# Patient Record
Sex: FEMALE | Race: WHITE | HISPANIC OR LATINO | Employment: OTHER | URBAN - METROPOLITAN AREA
[De-identification: names, ages, dates, MRNs, and addresses within clinical notes are randomized per-mention and may not be internally consistent; named-entity substitution may affect disease eponyms.]

---

## 2017-01-11 ENCOUNTER — LAB VISIT (OUTPATIENT)
Dept: LAB | Facility: HOSPITAL | Age: 57
End: 2017-01-11
Attending: INTERNAL MEDICINE

## 2017-01-11 DIAGNOSIS — Z79.899 ENCOUNTER FOR LONG-TERM (CURRENT) USE OF OTHER MEDICATIONS: Primary | ICD-10-CM

## 2017-01-11 PROCEDURE — 80197 ASSAY OF TACROLIMUS: CPT

## 2017-01-12 LAB — TACROLIMUS BLD-MCNC: 5.5 NG/ML

## 2017-03-31 ENCOUNTER — TELEPHONE (OUTPATIENT)
Dept: TRANSPLANT | Facility: CLINIC | Age: 57
End: 2017-03-31

## 2017-07-20 ENCOUNTER — LAB VISIT (OUTPATIENT)
Dept: LAB | Facility: HOSPITAL | Age: 57
End: 2017-07-20
Attending: INTERNAL MEDICINE

## 2017-07-20 DIAGNOSIS — T86.10 COMPLICATIONS OF TRANSPLANTED KIDNEY: ICD-10-CM

## 2017-07-20 DIAGNOSIS — Z79.899 ENCOUNTER FOR LONG-TERM (CURRENT) USE OF OTHER MEDICATIONS: ICD-10-CM

## 2017-07-20 DIAGNOSIS — Z94.0 KIDNEY REPLACED BY TRANSPLANT: Primary | ICD-10-CM

## 2017-07-20 LAB — TACROLIMUS BLD-MCNC: 5.5 NG/ML

## 2017-07-20 PROCEDURE — 36415 COLL VENOUS BLD VENIPUNCTURE: CPT

## 2017-07-20 PROCEDURE — 80197 ASSAY OF TACROLIMUS: CPT

## 2018-07-12 ENCOUNTER — TELEPHONE (OUTPATIENT)
Dept: TRANSPLANT | Facility: CLINIC | Age: 58
End: 2018-07-12

## 2018-07-12 DIAGNOSIS — Z94.0 STATUS POST KIDNEY TRANSPLANT: ICD-10-CM

## 2018-07-12 DIAGNOSIS — Z48.298 AFTERCARE FOLLOWING ORGAN TRANSPLANT: ICD-10-CM

## 2018-07-24 ENCOUNTER — LAB VISIT (OUTPATIENT)
Dept: LAB | Facility: HOSPITAL | Age: 58
End: 2018-07-24
Attending: INTERNAL MEDICINE

## 2018-07-24 DIAGNOSIS — Z48.298 AFTERCARE FOLLOWING ORGAN TRANSPLANT: ICD-10-CM

## 2018-07-24 DIAGNOSIS — Z94.0 STATUS POST KIDNEY TRANSPLANT: ICD-10-CM

## 2018-07-24 LAB
ALBUMIN SERPL BCP-MCNC: 3.7 G/DL
ALBUMIN SERPL BCP-MCNC: 3.7 G/DL
ALP SERPL-CCNC: 122 U/L
ALT SERPL W/O P-5'-P-CCNC: 24 U/L
ANION GAP SERPL CALC-SCNC: 8 MMOL/L
AST SERPL-CCNC: 17 U/L
BASOPHILS # BLD AUTO: 0.03 K/UL
BASOPHILS NFR BLD: 0.5 %
BILIRUB DIRECT SERPL-MCNC: 0.2 MG/DL
BILIRUB SERPL-MCNC: 0.6 MG/DL
BUN SERPL-MCNC: 27 MG/DL
CALCIUM SERPL-MCNC: 9 MG/DL
CHLORIDE SERPL-SCNC: 108 MMOL/L
CO2 SERPL-SCNC: 25 MMOL/L
CREAT SERPL-MCNC: 1.2 MG/DL
DIFFERENTIAL METHOD: ABNORMAL
EOSINOPHIL # BLD AUTO: 0.1 K/UL
EOSINOPHIL NFR BLD: 0.9 %
ERYTHROCYTE [DISTWIDTH] IN BLOOD BY AUTOMATED COUNT: 13.3 %
EST. GFR  (AFRICAN AMERICAN): 58.4 ML/MIN/1.73 M^2
EST. GFR  (NON AFRICAN AMERICAN): 50.6 ML/MIN/1.73 M^2
GLUCOSE SERPL-MCNC: 75 MG/DL
HCT VFR BLD AUTO: 40.7 %
HGB BLD-MCNC: 12.8 G/DL
IMM GRANULOCYTES # BLD AUTO: 0.03 K/UL
IMM GRANULOCYTES NFR BLD AUTO: 0.5 %
LYMPHOCYTES # BLD AUTO: 2.2 K/UL
LYMPHOCYTES NFR BLD: 37.5 %
MCH RBC QN AUTO: 30.2 PG
MCHC RBC AUTO-ENTMCNC: 31.4 G/DL
MCV RBC AUTO: 96 FL
MONOCYTES # BLD AUTO: 0.6 K/UL
MONOCYTES NFR BLD: 10.3 %
NEUTROPHILS # BLD AUTO: 2.9 K/UL
NEUTROPHILS NFR BLD: 50.3 %
NRBC BLD-RTO: 0 /100 WBC
PHOSPHATE SERPL-MCNC: 3.3 MG/DL
PLATELET # BLD AUTO: 272 K/UL
PMV BLD AUTO: 10.6 FL
POTASSIUM SERPL-SCNC: 4.1 MMOL/L
PROT SERPL-MCNC: 7.3 G/DL
RBC # BLD AUTO: 4.24 M/UL
SODIUM SERPL-SCNC: 141 MMOL/L
WBC # BLD AUTO: 5.74 K/UL

## 2018-07-24 PROCEDURE — 36415 COLL VENOUS BLD VENIPUNCTURE: CPT | Mod: PO

## 2018-07-24 PROCEDURE — 84155 ASSAY OF PROTEIN SERUM: CPT

## 2018-07-24 PROCEDURE — 80069 RENAL FUNCTION PANEL: CPT

## 2018-07-24 PROCEDURE — 80197 ASSAY OF TACROLIMUS: CPT

## 2018-07-24 PROCEDURE — 85025 COMPLETE CBC W/AUTO DIFF WBC: CPT

## 2018-07-25 ENCOUNTER — OFFICE VISIT (OUTPATIENT)
Dept: TRANSPLANT | Facility: CLINIC | Age: 58
End: 2018-07-25

## 2018-07-25 VITALS
SYSTOLIC BLOOD PRESSURE: 150 MMHG | WEIGHT: 130.31 LBS | OXYGEN SATURATION: 98 % | DIASTOLIC BLOOD PRESSURE: 91 MMHG | RESPIRATION RATE: 17 BRPM | BODY MASS INDEX: 20.94 KG/M2 | TEMPERATURE: 98 F | HEIGHT: 66 IN | HEART RATE: 65 BPM

## 2018-07-25 DIAGNOSIS — I10 ESSENTIAL HYPERTENSION: Chronic | ICD-10-CM

## 2018-07-25 DIAGNOSIS — N18.30 CKD (CHRONIC KIDNEY DISEASE) STAGE 3, GFR 30-59 ML/MIN: Chronic | ICD-10-CM

## 2018-07-25 DIAGNOSIS — Z94.0 LIVING-DONOR KIDNEY TRANSPLANT RECIPIENT: Primary | Chronic | ICD-10-CM

## 2018-07-25 DIAGNOSIS — Z29.89 PROPHYLACTIC IMMUNOTHERAPY: ICD-10-CM

## 2018-07-25 DIAGNOSIS — D84.9 IMMUNOSUPPRESSION: Chronic | ICD-10-CM

## 2018-07-25 LAB — TACROLIMUS BLD-MCNC: 4.7 NG/ML

## 2018-07-25 PROCEDURE — 99215 OFFICE O/P EST HI 40 MIN: CPT | Mod: S$PBB,,, | Performed by: NURSE PRACTITIONER

## 2018-07-25 PROCEDURE — 99999 PR PBB SHADOW E&M-EST. PATIENT-LVL IV: CPT | Mod: PBBFAC,,, | Performed by: NURSE PRACTITIONER

## 2018-07-25 PROCEDURE — 99214 OFFICE O/P EST MOD 30 MIN: CPT | Mod: PBBFAC | Performed by: NURSE PRACTITIONER

## 2018-07-25 RX ORDER — TACROLIMUS 1 MG/1
CAPSULE ORAL
Qty: 90 CAPSULE | Refills: 11 | Status: SHIPPED | OUTPATIENT
Start: 2018-07-25 | End: 2022-11-02 | Stop reason: DRUGHIGH

## 2018-07-25 RX ORDER — PREDNISONE 5 MG/1
5 TABLET ORAL DAILY
Qty: 30 TABLET | Refills: 11 | Status: SHIPPED | OUTPATIENT
Start: 2018-07-25

## 2018-07-25 NOTE — LETTER
July 25, 2018                     [address]  [address]  [facility] [address]  Phone: [phone]   Patient: Bacilio Becerra   MR Number: [id number]   YOB: 1960   Date of Visit: 7/25/2018       Dear      Thank you for referring Bacilio Becerra to me for evaluation. Attached you will find relevant portions of my assessment and plan of care.    If you have questions, please do not hesitate to call me. I look forward to following Bacilio Becerra along with you.    Sincerely,    Monika Mir, NP    Enclosure    If you would like to receive this communication electronically, please contact [email or web address] or [phone] to request [employer] access.    [employer] is a tool which provides read-only access to select patient information with whom you have a relationship. Its easy to use and provides real time access to review your patients record including encounter summaries, notes, results, and demographic information.    If you feel you have received this communication in error or would no longer like to receive these types of communications, please e-mail [email or web address]

## 2018-07-25 NOTE — PROGRESS NOTES
Kidney Post-Transplant Assessment    Referring Physician:   Current Nephrologist:     ORGAN: RIGHT KIDNEY  Donor Type: living  PHS Increased Risk:    Cold Ischemia: 240 mins  Induction Medications: steroids (prednisone,methylprednisolone,solumedrol,medrol,decadron)    Subjective:     CC:  Reassessment of renal allograft function and management of chronic immunosuppression.    HPI:  Ms. Becerra is a 58 y.o. year old White female who received a living kidney transplant on 8/7/97.  She has CKD stage 3 - GFR 30-59 and her baseline creatinine is between 1.1-1.3. She takes prednisone and tacrolimus for maintenance immunosuppression. She denies any recent hospitalizations or ER visits since her previous clinic visit.    Overall feels well. No health concerns today.   Denies chest pain, SOB, leg pain, abdominal pain or LUTs.  She is heading home to [place] in a few days.     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   HENT: Positive for postnasal drip. Negative for congestion, facial swelling, rhinorrhea, sinus pressure, sore throat and trouble swallowing.         Started with cold symptoms today   Eyes: Negative for pain, redness and visual  "disturbance.   Respiratory: Positive for cough. Negative for chest tightness, shortness of breath and wheezing.         Intermittent non productive cough   Cardiovascular: Negative.  Negative for chest pain, palpitations and leg swelling.   Gastrointestinal: Negative for abdominal pain, diarrhea, nausea and vomiting.   Genitourinary: Negative for dysuria, flank pain and urgency.   Musculoskeletal: Negative for gait problem, neck pain and neck stiffness.   Skin: Negative for rash.   Allergic/Immunologic: Positive for immunocompromised state. Negative for environmental allergies and food allergies.   Neurological: Negative for dizziness, weakness, light-headedness and headaches.   Psychiatric/Behavioral: Negative for agitation and confusion. The patient is not nervous/anxious.        Objective:   Blood pressure (!) 150/91, pulse 65, temperature 98.2 °F (36.8 °C), temperature source Oral, resp. rate 17, height 5' 6.14" (1.68 m), weight 59.1 kg (130 lb 4.7 oz), SpO2 98 %.body mass index is 20.94 kg/m².    Physical Exam   Constitutional: She is oriented to person, place, and time. She appears well-developed and well-nourished.   HENT:   Head: Normocephalic.   Mouth/Throat: Oropharynx is clear and moist. No oropharyngeal exudate.   Eyes: Conjunctivae and EOM are normal. Pupils are equal, round, and reactive to light. No scleral icterus.   Neck: Normal range of motion. Neck supple.   Cardiovascular: Normal rate, regular rhythm and normal heart sounds.    Pulmonary/Chest: Effort normal and breath sounds normal.   Abdominal: Soft. Normal appearance and bowel sounds are normal. She exhibits no distension and no mass. There is no splenomegaly or hepatomegaly. There is no tenderness. There is no rebound, no guarding, no CVA tenderness, no tenderness at McBurney's point and negative Dao's sign.       Musculoskeletal: Normal range of motion. She exhibits no edema.   Lymphadenopathy:     She has no cervical adenopathy. "   Neurological: She is alert and oriented to person, place, and time. She exhibits normal muscle tone. Coordination normal.   Skin: Skin is warm and dry.   Psychiatric: She has a normal mood and affect. Her behavior is normal.   Vitals reviewed.      Labs:  Lab Results   Component Value Date    WBC 5.74 07/24/2018    HGB 12.8 07/24/2018    HCT 40.7 07/24/2018     07/24/2018    K 4.1 07/24/2018     07/24/2018    CO2 25 07/24/2018    BUN 27 (H) 07/24/2018    CREATININE 1.2 07/24/2018    EGFRNONAA 50.6 (A) 07/24/2018    CALCIUM 9.0 07/24/2018    PHOS 3.3 07/24/2018    MG 2.0 07/19/2016    ALBUMIN 3.7 07/24/2018    ALBUMIN 3.7 07/24/2018    AST 17 07/24/2018    ALT 24 07/24/2018       No results found for: EXTANC, EXTWBC, EXTSEGS, EXTPLATELETS, EXTHEMOGLOBI, EXTHEMATOCRI, EXTCREATININ, EXTSODIUM, EXTPOTASSIUM, EXTBUN, EXTCO2, EXTCALCIUM, EXTPHOSPHORU, EXTGLUCOSE, EXTALBUMIN, EXTAST, EXTALT, EXTBILITOTAL, EXTLIPASE, EXTAMYLASE    No results found for: EXTCYCLOSLVL, EXTSIROLIMUS, EXTTACROLVL, EXTPROTCRE, EXTPTHINTACT, EXTPROTEINUA, EXTWBCUA, EXTRBCUA    Labs were reviewed with the patient.    Assessment:     1. Living-donor kidney transplant #2 8/7/1997    2. CKD (chronic kidney disease) stage 3, GFR 30-59 ml/min    3. Essential hypertension    4. Prophylactic immunotherapy    5. Immunosuppression        Plan:       Follow-up:   1. CKD stage: 3 stable    2. Immunosuppression:   Prograf trough 4.7, which is  therapeutic target 4-6. Continue Prograf 2/1 and Prednisone 5 mg QD  Will continue to monitor for drug toxicities    3. Allograft Function: Stable. Continue good po hydration.    Lab Results   Component Value Date    CREATININE 1.2 07/24/2018 7/24/2018  20yr 11mo   eGFR if non African American >60 mL/min/1.73 m^2 50.6 (A)             4. Hypertension management: advise low salt diet and home BP monitoring    No BP meds     5. Metabolic Bone Disease/Secondary Hyperparathyroidism:stable  Will monitor  PTH, CA and Vit D/guidelines,    Lab Results   Component Value Date    PTH 61 (H) 02/18/2008    CALCIUM 9.0 07/24/2018    PHOS 3.3 07/24/2018       6. Electrolytes:  Will monitor /guidelines  Lab Results   Component Value Date     07/24/2018    K 4.1 07/24/2018     07/24/2018    CO2 25 07/24/2018       7. Anemia: stable. No need for intervention    Will monitor /guidelines  Lab Results   Component Value Date    WBC 5.74 07/24/2018    HGB 12.8 07/24/2018    HCT 40.7 07/24/2018    MCV 96 07/24/2018     07/24/2018       8.  Cytopenias: no significant cytopenias will monitor as per our guidelines. Medicine list reviewed including potential causes of drug-induced cytopenias      9.Proteinuria: continue p/c ratio as per guidelines     7/24/2018  20yr 11mo   Prot/Creat Ratio, Ur 0.00 - 0.20 0.16       10. BK virus infection screening:  will continue to monitor/ guidelines    11. Weight education: provided during the clinic visit   Body mass index is 20.94 kg/m².       12.Patient safety education regarding immunosuppression including prophylaxis posttransplant for CMV, PCP : Education provided about vaccination and prevention of infections       Follow-up:   Annual follow-up with kidney transplant clinic with repeat labs, including renal function panel with UA, urine protein/creatinine ratio, and drug trough level q3 months.  Patient also reminded to follow-up with general nephrologist.    Monika Mir NP       Education:   Material provided to the patient.  Patient reminded to call with any health changes since these can be early signs of significant complications.  Also, I advised the patient to be sure any new medications or changes of old medications are discussed with either a pharmacist or physician knowledgeable with transplant to avoid rejection/drug toxicity related to significant drug interactions.    UNOS Patient Status  Functional Status: 80% - Normal activity with effort: some symptoms of  disease  Physical Capacity: No Limitations

## 2019-07-11 ENCOUNTER — LAB VISIT (OUTPATIENT)
Dept: LAB | Facility: HOSPITAL | Age: 59
End: 2019-07-11
Attending: INTERNAL MEDICINE

## 2019-07-11 DIAGNOSIS — Z79.899 NEED FOR PROPHYLACTIC CHEMOTHERAPY: ICD-10-CM

## 2019-07-11 DIAGNOSIS — Z94.0 KIDNEY REPLACED BY TRANSPLANT: Primary | ICD-10-CM

## 2019-07-11 LAB — TACROLIMUS BLD-MCNC: 4.2 NG/ML (ref 5–15)

## 2019-07-11 PROCEDURE — 80197 ASSAY OF TACROLIMUS: CPT

## 2019-09-12 ENCOUNTER — DOCUMENTATION ONLY (OUTPATIENT)
Dept: TRANSPLANT | Facility: CLINIC | Age: 59
End: 2019-09-12

## 2020-10-27 ENCOUNTER — TELEPHONE (OUTPATIENT)
Dept: TRANSPLANT | Facility: CLINIC | Age: 60
End: 2020-10-27

## 2020-10-27 ENCOUNTER — DOCUMENTATION ONLY (OUTPATIENT)
Dept: TRANSPLANT | Facility: CLINIC | Age: 60
End: 2020-10-27

## 2020-10-27 NOTE — TELEPHONE ENCOUNTER
Lives in [place]; comes to [facility] q1-2 years; usually buys ONE YEAR of Prograf while [place] the [place]...we provide the full year Rx if requested. Must schedule at least 6 weeks ahead of time to allow affordable airfare from [place].    Lab orders and results are sent to pt via International Medicine Department    Financial Issues:  Medicare covered transplant: No (International patient; paid cash)  30day or 365 day Rxes (usually buys ONE YEAR of Prograf while in the [place]...we provide the full year Rx if requested.)    Last seen 2018.  Last tac level 2019.    Will attempt to send letter and lab orders to pt via international medicine dept per instructions left by pt previous post kidney transplant coordinator, Torrie Chicas.

## 2020-10-27 NOTE — NURSING
"Email sent to international dept.for assistance with arranging labs and clinic appointment.    "Good afternoon.     Could you please point me in the direction of who I need to talk to for help with an international patient residing in [place].  MRN # [id number] Bacilio Becerra had a kidney transplant 23 years ago.  I am the patients assigned kidney transplant coordinator.  I have not had any interaction with this patient.  Per previous coordinator note in chart   Pt lives in [place]; comes to [facility] q1-2 years; usually buys ONE YEAR of Prograf while [place] the [place]...we provide the full year Rx if requested. Must schedule at least 6 weeks ahead of time to allow affordable airfare from [place].  Lab orders and results sent to pt via International Medicine Department. Notes refer to assistance from someone named Jane but I cannot find her in EPIC or email.    Pt has not been seen by transplant since July 2018 and has only had a prograf level for us in 8/2019 and has been over 1 year.     I need to reach out to Mrs. Becerra to make sure that she still wants to follow with Kidney transplant and if so if she can have labs for us and if/when she can come in for a clinic appointment.    Thank you  Adelina Vigil RN  Post Kidney Transplant Coordinator     [address] [place] [address]  [phone] "    Awaiting response.  "

## 2020-11-03 ENCOUNTER — DOCUMENTATION ONLY (OUTPATIENT)
Dept: TRANSPLANT | Facility: CLINIC | Age: 60
End: 2020-11-03

## 2020-11-03 NOTE — NURSING
"Communication has been initiated between International Transplant Coordinator, Norma Gaspar, and the patient.    "Good Morning Adelina,  I was able to communicate with Mrs. Becerra,  Despite the situation in [place], she is in good condition. She has not been able to send us the lab results because of the Covid.  She would like to get a list of labs that you need so that she can send you the results. In a separate email, I will send you what she sent me.".    Pt sent e-mail with attachment of lab checking her Cr on 10/25/20 and   Cr was 1.25.- Beautiful!  GFR 45.36.  BUN 26.17      "Good morning Adelina,  The patient email again to ask about the information you need. Could you please send me the list of labs that you need from the patient?  Thank you,Norma Gaspar"      "Thank you so much for following up on this as I did not see your previous response.  Im so glad that she is doing okay and is safe. That is a terrible storm.    The labs needed by transplant are a:    -Tacrolimus ( Prograf) level  -CBC ( Complete Blood Count )  -BMP ( Basic Metabolic Panel )  -Phosphorus Level  -Magnesium Level  -Albumin  -Hepatic Function Panel  We also need the following urine tests:  -Urinalysis  -Urine Protein / Creatinine Ratio  The ordering provider is Dr. Kashif Reynaga.  I have included a copy of the lab orders for her if needed.   Thank you so much for your assistance in this.  Adelina Vigil RN"            "

## 2021-01-08 ENCOUNTER — PATIENT MESSAGE (OUTPATIENT)
Dept: TRANSPLANT | Facility: CLINIC | Age: 61
End: 2021-01-08

## 2021-05-19 ENCOUNTER — TELEPHONE (OUTPATIENT)
Dept: TRANSPLANT | Facility: CLINIC | Age: 61
End: 2021-05-19

## 2021-06-16 DIAGNOSIS — Z29.89 NEED FOR PROPHYLACTIC IMMUNOTHERAPY: ICD-10-CM

## 2021-06-16 DIAGNOSIS — N28.9 DISORDER OF KIDNEY AND URETER: ICD-10-CM

## 2021-06-16 DIAGNOSIS — Z48.298 AFTERCARE FOLLOWING ORGAN TRANSPLANT: ICD-10-CM

## 2021-10-05 ENCOUNTER — LAB VISIT (OUTPATIENT)
Dept: LAB | Facility: HOSPITAL | Age: 61
End: 2021-10-05
Attending: INTERNAL MEDICINE

## 2021-10-05 DIAGNOSIS — Z29.89 NEED FOR PROPHYLACTIC IMMUNOTHERAPY: ICD-10-CM

## 2021-10-05 DIAGNOSIS — Z48.298 AFTERCARE FOLLOWING ORGAN TRANSPLANT: ICD-10-CM

## 2021-10-05 DIAGNOSIS — N28.9 DISORDER OF KIDNEY AND URETER: ICD-10-CM

## 2021-10-05 LAB
25(OH)D3+25(OH)D2 SERPL-MCNC: 43 NG/ML (ref 30–96)
ALBUMIN SERPL BCP-MCNC: 4 G/DL (ref 3.5–5.2)
ALBUMIN SERPL BCP-MCNC: 4 G/DL (ref 3.5–5.2)
ALP SERPL-CCNC: 63 U/L (ref 55–135)
ALT SERPL W/O P-5'-P-CCNC: 22 U/L (ref 10–44)
ANION GAP SERPL CALC-SCNC: 15 MMOL/L (ref 8–16)
AST SERPL-CCNC: 16 U/L (ref 10–40)
BASOPHILS # BLD AUTO: 0.04 K/UL (ref 0–0.2)
BASOPHILS NFR BLD: 0.7 % (ref 0–1.9)
BILIRUB DIRECT SERPL-MCNC: 0.2 MG/DL (ref 0.1–0.3)
BILIRUB SERPL-MCNC: 1 MG/DL (ref 0.1–1)
BILIRUB UR QL STRIP: NEGATIVE
BUN SERPL-MCNC: 24 MG/DL (ref 6–20)
CALCIUM SERPL-MCNC: 9.5 MG/DL (ref 8.7–10.5)
CHLORIDE SERPL-SCNC: 108 MMOL/L (ref 95–110)
CHOLEST SERPL-MCNC: 297 MG/DL (ref 120–199)
CHOLEST/HDLC SERPL: 4.5 {RATIO} (ref 2–5)
CLARITY UR: CLEAR
CO2 SERPL-SCNC: 18 MMOL/L (ref 23–29)
COLOR UR: YELLOW
CREAT SERPL-MCNC: 1.2 MG/DL (ref 0.5–1.4)
CREAT UR-MCNC: 72 MG/DL (ref 15–325)
DIFFERENTIAL METHOD: ABNORMAL
EOSINOPHIL # BLD AUTO: 0.1 K/UL (ref 0–0.5)
EOSINOPHIL NFR BLD: 1.7 % (ref 0–8)
ERYTHROCYTE [DISTWIDTH] IN BLOOD BY AUTOMATED COUNT: 13.6 % (ref 11.5–14.5)
EST. GFR  (AFRICAN AMERICAN): 57.2 ML/MIN/1.73 M^2
EST. GFR  (NON AFRICAN AMERICAN): 49.6 ML/MIN/1.73 M^2
GLUCOSE SERPL-MCNC: 86 MG/DL (ref 70–110)
GLUCOSE UR QL STRIP: NEGATIVE
HCT VFR BLD AUTO: 40.3 % (ref 37–48.5)
HDLC SERPL-MCNC: 66 MG/DL (ref 40–75)
HDLC SERPL: 22.2 % (ref 20–50)
HGB BLD-MCNC: 13.4 G/DL (ref 12–16)
HGB UR QL STRIP: NEGATIVE
IMM GRANULOCYTES # BLD AUTO: 0.01 K/UL (ref 0–0.04)
IMM GRANULOCYTES NFR BLD AUTO: 0.2 % (ref 0–0.5)
KETONES UR QL STRIP: NEGATIVE
LDLC SERPL CALC-MCNC: 196 MG/DL (ref 63–159)
LEUKOCYTE ESTERASE UR QL STRIP: NEGATIVE
LYMPHOCYTES # BLD AUTO: 1.8 K/UL (ref 1–4.8)
LYMPHOCYTES NFR BLD: 33 % (ref 18–48)
MAGNESIUM SERPL-MCNC: 1.9 MG/DL (ref 1.6–2.6)
MCH RBC QN AUTO: 31.4 PG (ref 27–31)
MCHC RBC AUTO-ENTMCNC: 33.3 G/DL (ref 32–36)
MCV RBC AUTO: 94 FL (ref 82–98)
MONOCYTES # BLD AUTO: 0.5 K/UL (ref 0.3–1)
MONOCYTES NFR BLD: 8.4 % (ref 4–15)
NEUTROPHILS # BLD AUTO: 3 K/UL (ref 1.8–7.7)
NEUTROPHILS NFR BLD: 56 % (ref 38–73)
NITRITE UR QL STRIP: NEGATIVE
NONHDLC SERPL-MCNC: 231 MG/DL
NRBC BLD-RTO: 0 /100 WBC
PH UR STRIP: 7 [PH] (ref 5–8)
PHOSPHATE SERPL-MCNC: 3.7 MG/DL (ref 2.7–4.5)
PLATELET # BLD AUTO: 250 K/UL (ref 150–450)
PMV BLD AUTO: 10.4 FL (ref 9.2–12.9)
POTASSIUM SERPL-SCNC: 4.5 MMOL/L (ref 3.5–5.1)
PROT SERPL-MCNC: 7.4 G/DL (ref 6–8.4)
PROT UR QL STRIP: NEGATIVE
PROT UR-MCNC: 7 MG/DL (ref 0–15)
PROT/CREAT UR: 0.1 MG/G{CREAT} (ref 0–0.2)
PTH-INTACT SERPL-MCNC: 104.6 PG/ML (ref 9–77)
RBC # BLD AUTO: 4.27 M/UL (ref 4–5.4)
SODIUM SERPL-SCNC: 141 MMOL/L (ref 136–145)
SP GR UR STRIP: 1.01 (ref 1–1.03)
TRIGL SERPL-MCNC: 175 MG/DL (ref 30–150)
URN SPEC COLLECT METH UR: NORMAL
WBC # BLD AUTO: 5.37 K/UL (ref 3.9–12.7)

## 2021-10-05 PROCEDURE — 83970 ASSAY OF PARATHORMONE: CPT | Performed by: INTERNAL MEDICINE

## 2021-10-05 PROCEDURE — 80061 LIPID PANEL: CPT | Performed by: INTERNAL MEDICINE

## 2021-10-05 PROCEDURE — 86352 CELL FUNCTION ASSAY W/STIM: CPT | Performed by: INTERNAL MEDICINE

## 2021-10-05 PROCEDURE — 84156 ASSAY OF PROTEIN URINE: CPT | Performed by: INTERNAL MEDICINE

## 2021-10-05 PROCEDURE — 84075 ASSAY ALKALINE PHOSPHATASE: CPT | Performed by: INTERNAL MEDICINE

## 2021-10-05 PROCEDURE — 85025 COMPLETE CBC W/AUTO DIFF WBC: CPT | Performed by: INTERNAL MEDICINE

## 2021-10-05 PROCEDURE — 83735 ASSAY OF MAGNESIUM: CPT | Performed by: INTERNAL MEDICINE

## 2021-10-05 PROCEDURE — 80197 ASSAY OF TACROLIMUS: CPT | Performed by: INTERNAL MEDICINE

## 2021-10-05 PROCEDURE — 81003 URINALYSIS AUTO W/O SCOPE: CPT | Performed by: INTERNAL MEDICINE

## 2021-10-05 PROCEDURE — 36415 COLL VENOUS BLD VENIPUNCTURE: CPT | Performed by: INTERNAL MEDICINE

## 2021-10-05 PROCEDURE — 82306 VITAMIN D 25 HYDROXY: CPT | Performed by: INTERNAL MEDICINE

## 2021-10-05 PROCEDURE — 84460 ALANINE AMINO (ALT) (SGPT): CPT | Performed by: INTERNAL MEDICINE

## 2021-10-05 PROCEDURE — 80069 RENAL FUNCTION PANEL: CPT | Performed by: INTERNAL MEDICINE

## 2021-10-06 LAB
TACROLIMUS BLD-MCNC: 5.4 NG/ML (ref 5–15)
TACROLIMUS, NORMALIZED: 4.9 NG/ML (ref 5–15)

## 2021-10-07 ENCOUNTER — OFFICE VISIT (OUTPATIENT)
Dept: TRANSPLANT | Facility: CLINIC | Age: 61
End: 2021-10-07

## 2021-10-07 VITALS
HEART RATE: 73 BPM | TEMPERATURE: 98 F | BODY MASS INDEX: 21.99 KG/M2 | OXYGEN SATURATION: 96 % | RESPIRATION RATE: 16 BRPM | DIASTOLIC BLOOD PRESSURE: 74 MMHG | HEIGHT: 63 IN | WEIGHT: 124.13 LBS | SYSTOLIC BLOOD PRESSURE: 165 MMHG

## 2021-10-07 DIAGNOSIS — E78.5 HYPERLIPIDEMIA, UNSPECIFIED HYPERLIPIDEMIA TYPE: ICD-10-CM

## 2021-10-07 DIAGNOSIS — D84.9 IMMUNOSUPPRESSION: Chronic | ICD-10-CM

## 2021-10-07 DIAGNOSIS — N18.31 STAGE 3A CHRONIC KIDNEY DISEASE: Primary | Chronic | ICD-10-CM

## 2021-10-07 DIAGNOSIS — E21.3 MILD HYPERPARATHYROIDISM: ICD-10-CM

## 2021-10-07 DIAGNOSIS — C56.9 MALIGNANT NEOPLASM OF OVARY, UNSPECIFIED LATERALITY: Chronic | ICD-10-CM

## 2021-10-07 DIAGNOSIS — M81.0 OSTEOPOROSIS, UNSPECIFIED OSTEOPOROSIS TYPE, UNSPECIFIED PATHOLOGICAL FRACTURE PRESENCE: Chronic | ICD-10-CM

## 2021-10-07 DIAGNOSIS — Z94.0 LIVING-DONOR KIDNEY TRANSPLANT RECIPIENT: Chronic | ICD-10-CM

## 2021-10-07 DIAGNOSIS — T86.12 FAILED KIDNEY TRANSPLANT: ICD-10-CM

## 2021-10-07 DIAGNOSIS — Z29.89 PROPHYLACTIC IMMUNOTHERAPY: ICD-10-CM

## 2021-10-07 LAB — IMMUNKNOW (STIMULATED): 475 NG/ML (ref 226–524)

## 2021-10-07 PROCEDURE — 99204 PR OFFICE/OUTPT VISIT, NEW, LEVL IV, 45-59 MIN: ICD-10-PCS | Mod: S$PBB,,, | Performed by: INTERNAL MEDICINE

## 2021-10-07 PROCEDURE — 99999 PR PBB SHADOW E&M-EST. PATIENT-LVL III: CPT | Mod: PBBFAC,,, | Performed by: INTERNAL MEDICINE

## 2021-10-07 PROCEDURE — 99213 OFFICE O/P EST LOW 20 MIN: CPT | Mod: PBBFAC | Performed by: INTERNAL MEDICINE

## 2021-10-07 PROCEDURE — 99204 OFFICE O/P NEW MOD 45 MIN: CPT | Mod: S$PBB,,, | Performed by: INTERNAL MEDICINE

## 2021-10-07 PROCEDURE — 99999 PR PBB SHADOW E&M-EST. PATIENT-LVL III: ICD-10-PCS | Mod: PBBFAC,,, | Performed by: INTERNAL MEDICINE

## 2021-10-07 RX ORDER — SODIUM BICARBONATE 650 MG/1
1300 TABLET ORAL 2 TIMES DAILY
Qty: 120 TABLET | Refills: 11 | Status: SHIPPED | OUTPATIENT
Start: 2021-10-07 | End: 2022-10-07

## 2022-05-11 ENCOUNTER — PATIENT MESSAGE (OUTPATIENT)
Dept: RESEARCH | Facility: CLINIC | Age: 62
End: 2022-05-11

## 2022-09-29 DIAGNOSIS — Z94.0 LIVING-DONOR KIDNEY TRANSPLANT RECIPIENT: Primary | ICD-10-CM

## 2022-11-01 ENCOUNTER — LAB VISIT (OUTPATIENT)
Dept: LAB | Facility: HOSPITAL | Age: 62
End: 2022-11-01
Attending: INTERNAL MEDICINE

## 2022-11-01 DIAGNOSIS — Z94.0 LIVING-DONOR KIDNEY TRANSPLANT RECIPIENT: ICD-10-CM

## 2022-11-01 LAB
ALBUMIN SERPL BCP-MCNC: 3.5 G/DL (ref 3.5–5.2)
ALBUMIN SERPL BCP-MCNC: 3.5 G/DL (ref 3.5–5.2)
ALP SERPL-CCNC: 63 U/L (ref 55–135)
ALT SERPL W/O P-5'-P-CCNC: 12 U/L (ref 10–44)
ANION GAP SERPL CALC-SCNC: 8 MMOL/L (ref 8–16)
AST SERPL-CCNC: 13 U/L (ref 10–40)
BASOPHILS # BLD AUTO: 0.04 K/UL (ref 0–0.2)
BASOPHILS NFR BLD: 0.6 % (ref 0–1.9)
BILIRUB DIRECT SERPL-MCNC: 0.3 MG/DL (ref 0.1–0.3)
BILIRUB SERPL-MCNC: 0.9 MG/DL (ref 0.1–1)
BUN SERPL-MCNC: 22 MG/DL (ref 6–20)
CALCIUM SERPL-MCNC: 8.7 MG/DL (ref 8.7–10.5)
CHLORIDE SERPL-SCNC: 110 MMOL/L (ref 95–110)
CO2 SERPL-SCNC: 20 MMOL/L (ref 23–29)
CREAT SERPL-MCNC: 1.3 MG/DL (ref 0.5–1.4)
DIFFERENTIAL METHOD: ABNORMAL
EOSINOPHIL # BLD AUTO: 0.1 K/UL (ref 0–0.5)
EOSINOPHIL NFR BLD: 1.4 % (ref 0–8)
ERYTHROCYTE [DISTWIDTH] IN BLOOD BY AUTOMATED COUNT: 14 % (ref 11.5–14.5)
EST. GFR  (NO RACE VARIABLE): 47.1 ML/MIN/1.73 M^2
GLUCOSE SERPL-MCNC: 77 MG/DL (ref 70–110)
HCT VFR BLD AUTO: 36.7 % (ref 37–48.5)
HGB BLD-MCNC: 12.1 G/DL (ref 12–16)
IMM GRANULOCYTES # BLD AUTO: 0.04 K/UL (ref 0–0.04)
IMM GRANULOCYTES NFR BLD AUTO: 0.6 % (ref 0–0.5)
LYMPHOCYTES # BLD AUTO: 2.2 K/UL (ref 1–4.8)
LYMPHOCYTES NFR BLD: 32.1 % (ref 18–48)
MAGNESIUM SERPL-MCNC: 1.9 MG/DL (ref 1.6–2.6)
MCH RBC QN AUTO: 31 PG (ref 27–31)
MCHC RBC AUTO-ENTMCNC: 33 G/DL (ref 32–36)
MCV RBC AUTO: 94 FL (ref 82–98)
MONOCYTES # BLD AUTO: 0.6 K/UL (ref 0.3–1)
MONOCYTES NFR BLD: 8.1 % (ref 4–15)
NEUTROPHILS # BLD AUTO: 4 K/UL (ref 1.8–7.7)
NEUTROPHILS NFR BLD: 57.2 % (ref 38–73)
NRBC BLD-RTO: 0 /100 WBC
PHOSPHATE SERPL-MCNC: 3.1 MG/DL (ref 2.7–4.5)
PLATELET # BLD AUTO: 242 K/UL (ref 150–450)
PMV BLD AUTO: 9.8 FL (ref 9.2–12.9)
POTASSIUM SERPL-SCNC: 3.9 MMOL/L (ref 3.5–5.1)
PROT SERPL-MCNC: 6.3 G/DL (ref 6–8.4)
RBC # BLD AUTO: 3.9 M/UL (ref 4–5.4)
SODIUM SERPL-SCNC: 138 MMOL/L (ref 136–145)
TACROLIMUS BLD-MCNC: 2.9 NG/ML (ref 5–15)
WBC # BLD AUTO: 6.95 K/UL (ref 3.9–12.7)

## 2022-11-01 PROCEDURE — 84075 ASSAY ALKALINE PHOSPHATASE: CPT | Performed by: INTERNAL MEDICINE

## 2022-11-01 PROCEDURE — 80197 ASSAY OF TACROLIMUS: CPT | Performed by: INTERNAL MEDICINE

## 2022-11-01 PROCEDURE — 80069 RENAL FUNCTION PANEL: CPT | Performed by: INTERNAL MEDICINE

## 2022-11-01 PROCEDURE — 36415 COLL VENOUS BLD VENIPUNCTURE: CPT | Performed by: INTERNAL MEDICINE

## 2022-11-01 PROCEDURE — 85025 COMPLETE CBC W/AUTO DIFF WBC: CPT | Performed by: INTERNAL MEDICINE

## 2022-11-01 PROCEDURE — 83735 ASSAY OF MAGNESIUM: CPT | Performed by: INTERNAL MEDICINE

## 2022-11-01 NOTE — PROGRESS NOTES
Will increase prograf to 2 mg po bid and get a repeat level in 1 week
PROVIDER:[TOKEN:[[id number]]]

## 2022-11-02 ENCOUNTER — DOCUMENTATION ONLY (OUTPATIENT)
Dept: TRANSPLANT | Facility: CLINIC | Age: 62
End: 2022-11-02

## 2022-11-02 DIAGNOSIS — D84.9 IMMUNOSUPPRESSION: Chronic | ICD-10-CM

## 2022-11-02 DIAGNOSIS — Z29.89 PROPHYLACTIC IMMUNOTHERAPY: ICD-10-CM

## 2022-11-02 DIAGNOSIS — Z94.0 LIVING-DONOR KIDNEY TRANSPLANT RECIPIENT: Chronic | ICD-10-CM

## 2022-11-02 RX ORDER — TACROLIMUS 1 MG/1
2 CAPSULE ORAL EVERY 12 HOURS
Qty: 120 CAPSULE | Refills: 11 | Status: SHIPPED | OUTPATIENT
Start: 2022-11-02 | End: 2023-03-09 | Stop reason: DRUGHIGH

## 2022-11-02 NOTE — TELEPHONE ENCOUNTER
Attempted to communicate with patient via Norma in the International Department. Unable to communicate with patient due to a bad connection.  Norma to communicate orders to patient.  Patient schedule to be seen in clinic on Friday 11/4.  Plan to repeat Prograf level here on Friday 11/4 before clinic appointment due to patient plan to return to [place] after her clinic appointment.  ----- Message from Kashif Reynaga MD sent at 11/1/2022  4:31 PM CDT -----  Will increase prograf to 2 mg po bid and get a repeat level in 1 week

## 2022-11-04 ENCOUNTER — OFFICE VISIT (OUTPATIENT)
Dept: TRANSPLANT | Facility: CLINIC | Age: 62
End: 2022-11-04

## 2022-11-04 ENCOUNTER — LAB VISIT (OUTPATIENT)
Dept: LAB | Facility: HOSPITAL | Age: 62
End: 2022-11-04
Attending: INTERNAL MEDICINE

## 2022-11-04 VITALS
OXYGEN SATURATION: 97 % | SYSTOLIC BLOOD PRESSURE: 137 MMHG | BODY MASS INDEX: 18.79 KG/M2 | DIASTOLIC BLOOD PRESSURE: 65 MMHG | RESPIRATION RATE: 16 BRPM | HEIGHT: 66 IN | TEMPERATURE: 98 F | WEIGHT: 116.88 LBS | HEART RATE: 68 BPM

## 2022-11-04 DIAGNOSIS — Z79.899 IMMUNOSUPPRESSIVE MANAGEMENT ENCOUNTER FOLLOWING KIDNEY TRANSPLANT: ICD-10-CM

## 2022-11-04 DIAGNOSIS — Z94.0 LIVING-DONOR KIDNEY TRANSPLANT RECIPIENT: Chronic | ICD-10-CM

## 2022-11-04 DIAGNOSIS — Z94.0 LIVING-DONOR KIDNEY TRANSPLANT RECIPIENT: ICD-10-CM

## 2022-11-04 DIAGNOSIS — Z94.0 IMMUNOSUPPRESSIVE MANAGEMENT ENCOUNTER FOLLOWING KIDNEY TRANSPLANT: ICD-10-CM

## 2022-11-04 DIAGNOSIS — I10 ESSENTIAL HYPERTENSION: Chronic | ICD-10-CM

## 2022-11-04 DIAGNOSIS — N18.31 STAGE 3A CHRONIC KIDNEY DISEASE: Primary | Chronic | ICD-10-CM

## 2022-11-04 DIAGNOSIS — E21.3 MILD HYPERPARATHYROIDISM: ICD-10-CM

## 2022-11-04 DIAGNOSIS — E78.2 MIXED HYPERLIPIDEMIA: ICD-10-CM

## 2022-11-04 LAB — TACROLIMUS BLD-MCNC: 3.9 NG/ML (ref 5–15)

## 2022-11-04 PROCEDURE — 36415 COLL VENOUS BLD VENIPUNCTURE: CPT | Performed by: INTERNAL MEDICINE

## 2022-11-04 PROCEDURE — 99999 PR PBB SHADOW E&M-EST. PATIENT-LVL IV: ICD-10-PCS | Mod: PBBFAC,,, | Performed by: INTERNAL MEDICINE

## 2022-11-04 PROCEDURE — 80197 ASSAY OF TACROLIMUS: CPT | Performed by: INTERNAL MEDICINE

## 2022-11-04 PROCEDURE — 99215 PR OFFICE/OUTPT VISIT, EST, LEVL V, 40-54 MIN: ICD-10-PCS | Mod: S$PBB,,, | Performed by: INTERNAL MEDICINE

## 2022-11-04 PROCEDURE — 99999 PR PBB SHADOW E&M-EST. PATIENT-LVL IV: CPT | Mod: PBBFAC,,, | Performed by: INTERNAL MEDICINE

## 2022-11-04 PROCEDURE — 99214 OFFICE O/P EST MOD 30 MIN: CPT | Mod: PBBFAC | Performed by: INTERNAL MEDICINE

## 2022-11-04 PROCEDURE — 99215 OFFICE O/P EST HI 40 MIN: CPT | Mod: S$PBB,,, | Performed by: INTERNAL MEDICINE

## 2022-11-04 NOTE — PROGRESS NOTES
Prograf dose increased a few days ago  Will repeat a tacro level in 3 weeks   Please see my note.  She will continue with standard release prograf for now but her insurance changed to XL prograf , so in 4 month she will start around 3 mg of prograf XL and will need a repeat level 4 days and 1 week after the switch

## 2022-11-04 NOTE — PROGRESS NOTES
Kidney Post-Transplant Assessment    Referring Physician:   Current Nephrologist:     ORGAN: RIGHT KIDNEY  Donor Type: living  PHS Increased Risk:   Cold Ischemia: 240 mins  Induction Medications: steroids (prednisone,methylprednisolone,solumedrol,medrol,decadron)    Subjective:     CC:  Reassessment of renal allograft function and management of chronic immunosuppression.    HPI:  Ms. Becerra is a 62 y.o. year old White female who received a living kidney transplant on 8/7/97.  She has CKD stage 2 - GFR 60-89 and her baseline creatinine is between 1.1 to 1.4. She takes prednisone and tacrolimus for maintenance immunosuppression. She denies any recent hospitalizations or ER visits since her previous clinic visit.    Refers an episode of sinusitis received levofloxacin    Also refer Dengue 3 months ago in August 2022. She was ill for 15 days no is recovered    She received 3 doses of COVID vaccine    Patient was given a prograf XL preparation.     We discussed the transition to a new agent, change to a new dose.       Current Outpatient Medications on File Prior to Visit   Medication Sig Dispense Refill    fish oil-omega-3 fatty acids 300-1,000 mg capsule Take 2 g by mouth once daily.      tacrolimus (PROGRAF) 1 MG Cap Take 2 capsules (2 mg total) by mouth every 12 (twelve) hours. Txp Date:8/7/1997 (Kidney) Disch Date: ICD10:Z94.0 Txp Location:[facility] 120 capsule 11    MVI, ADULT NO.1 WITH VIT K IV 1 tablet Daily.      predniSONE (DELTASONE) 5 MG tablet Take 1 tablet (5 mg total) by mouth once daily. (Patient not taking: Reported on 11/4/2022) 30 tablet 11    sodium bicarbonate 650 MG tablet Take 2 tablets (1,300 mg total) by mouth 2 (two) times daily. 120 tablet 11     No current facility-administered medications on file prior to visit.            Review of Systems    Skin: no skin rash  CNS; no headaches, blurred vision, seizure, or syncope  ENT: No JVD,  Adenopathies,  nasal congestion. No oral lesions  Cardiac:  "No chest pain, dyspnea, claudication, edema or palpitations  Respiratory: No SOB, cough, hemoptysis   Gastro-intestinal: No diarrhea, constipation, abdominal pain, nausea, vomit. No ascitis  Genitourinary: no hematuria, dysuria, frequency, frequency  Musculoskeletal: joint pain, arthritis or vasculitic changes  Psych: alert awake, oriented, No cranial nerves deficit.      Objective:   Blood pressure 137/65, pulse 68, temperature 97.7 °F (36.5 °C), temperature source Tympanic, resp. rate 16, height 5' 5.75" (1.67 m), weight 53 kg (116 lb 13.5 oz), SpO2 97 %.body mass index is 19 kg/m².    Physical Exam      Head: normocephalic  Neck: No JVD, cervical axillary, or femoral adenopathies  Heart: no murmurs, Normal s1 and s2, No gallops, no rubs, No murmurs  Lungs; CTA, good respiratory effort, no crackles  Abdomen: soft, non tender, no splenomegaly or hepatomegaly, no massess, no bruits  Extremities: No edema, skin rash, joint pain  SNC: awake, alert oriented. Cranial nerves are intact, no focalized, sensitivity and strength preserved      Labs:  Lab Results   Component Value Date    WBC 6.95 11/01/2022    HGB 12.1 11/01/2022    HCT 36.7 (L) 11/01/2022     11/01/2022    K 3.9 11/01/2022     11/01/2022    CO2 20 (L) 11/01/2022    BUN 22 (H) 11/01/2022    CREATININE 1.3 11/01/2022    EGFRNONAA 49.6 (A) 10/05/2021    CALCIUM 8.7 11/01/2022    PHOS 3.1 11/01/2022    MG 1.9 11/01/2022    ALBUMIN 3.5 11/01/2022    ALBUMIN 3.5 11/01/2022    AST 13 11/01/2022    ALT 12 11/01/2022       No results found for: EXTANC, EXTWBC, EXTSEGS, EXTPLATELETS, EXTHEMOGLOBI, EXTHEMATOCRI, EXTCREATININ, EXTSODIUM, EXTPOTASSIUM, EXTBUN, EXTCO2, EXTCALCIUM, EXTPHOSPHORU, EXTGLUCOSE, EXTALBUMIN, EXTAST, EXTALT, EXTBILITOTAL, EXTLIPASE, EXTAMYLASE    No results found for: EXTCYCLOSLVL, EXTSIROLIMUS, EXTTACROLVL, EXTPROTCRE, EXTPTHINTACT, EXTPROTEINUA, EXTWBCUA, EXTRBCUA    Labs were reviewed with the patient.    Assessment:     1. Stage " 3a chronic kidney disease    2. Essential hypertension    3. Immunosuppressive management encounter following kidney transplant    4. Living-donor kidney transplant #2 8/7/1997    5. Mild hyperparathyroidism    6. Mixed hyperlipidemia        Plan:         1. CKD stage 3a with stable creatinine: will continue follow up as per our center guidelines. patient to continue close follow up with the local General nephrologist. Education provided in appropriate fluid intake, potassium intake. Continue with oral hydration.    1A. Pancreatic Function: N/A for non-pancreas allograft recipients:     2. High risk immunosuppression medication for organ transplantation, requiring regular intensive follow up and monitoring : prograf [place] on standard release, dose increased to 2 mg PO bid. Will repeat a level in 4 weeks   Lab Results   Component Value Date    TACROLIMUS 3.9 (L) 11/04/2022    TACROLIMUS 2.9 (L) 11/01/2022    TACROLIMUS 5.4 10/05/2021     No results found for: CYCLOSPORINE  @   Will closely monitor for toxicities, education provided about adherence to medicines and need to communicate any side effects to the transplant nurse or physician.    3. Allograft Function:stable at baseline for the patient. Continue follow up as per our guidelines and with the local General nephrologist. Communication will be sent today.  Lab Results   Component Value Date    CREATININE 1.3 11/01/2022    CREATININE 1.2 10/05/2021    CREATININE 1.2 07/24/2018     No results found for: AMYLASE, LIPASE    4. Hypertension management: well controlled at home  Continue with home blood pressure monitoring, low salt and healthy life discussed with the patient..    5. Metabolic Bone Disease/Secondary Hyperparathyroidism:calcium and phosphorus level discussed with the patient, patient will continue follow up with the general nephrologist for management of metabolic bone disease. Will monitor PTH and Vit D per our transplant center guidelines.       Lab Results   Component Value Date    .6 (H) 10/05/2021    CALCIUM 8.7 11/01/2022    PHOS 3.1 11/01/2022    PHOS 3.7 10/05/2021    PHOS 3.3 07/24/2018       6. Electrolytes and acid base balance: reviewed with the patient, essentially within the normal range no need for acute changes today, will monitor as per our center guidelines.     Lab Results   Component Value Date     11/01/2022    K 3.9 11/01/2022     11/01/2022    CO2 20 (L) 11/01/2022    CO2 18 (L) 10/05/2021    CO2 25 07/24/2018       7. Anemia: will continue monitoring as per our center guidelines. No indication for acute intervention today.     Lab Results   Component Value Date    WBC 6.95 11/01/2022    HGB 12.1 11/01/2022    HCT 36.7 (L) 11/01/2022    MCV 94 11/01/2022     11/01/2022       8.Proteinuria: will continue with pr/cr ratio as per our center guidelines.  Lab Results   Component Value Date    PROTEINURINE <7 11/01/2022    CREATRANDUR 45.0 11/01/2022    UTPCR Unable to calculate 11/01/2022    UTPCR 0.10 10/05/2021    UTPCR 0.16 07/24/2018        9. BK virus infection screening: will continue with urine or blood PCR as per our guidelines to prevent BK virus viremia and allograft dysfunction  Lab Results   Component Value Date    BKVIRUSDNAUR POSITIVE (A) 07/22/2014    BKQUANTURINE 5639 (H) 07/22/2014    BKVIRUSLOG 3.75 (H) 07/22/2014    BKVIRUSURINE Urine 07/22/2014         10. Weight and metabolic management: education provided provided during the clinic visit.   Body mass index is 19 kg/m².       11.Patient safety education regarding immunosuppression including prophylaxis posttransplant for CMV, PCP : Education provided about vaccination and prevention of infections.    12.  Cytopenias: no significant cytopenias will monitor as per our guidelines. Medicine list reviewed including potential causes of drug-induced cytopenias     Lab Results   Component Value Date    WBC 6.95 11/01/2022    HGB 12.1 11/01/2022    HCT  36.7 (L) 11/01/2022    MCV 94 11/01/2022     11/01/2022       13. Post-transplant Prophylaxis; CMV Infection, PJP and Candida mucosistis and other indicated for this particular patient. OFF prophylaxis.     I spoke with the patient for 30 minutes. More than half dedicated to counseling and education. All questions answered    Kashif Reynaga MD  Transplant Nephrology            Follow-up:   Annual follow-up with kidney transplant clinic per written guidelines.  Patient also reminded to follow-up with general nephrologist.    Labs: since patient remains at high risk for rejection and drug-related complications that warrant close monitoring, labs will be ordered as follows: continue twice weekly CBC, renal panel, and drug level for first month; then same labs once weekly through 3rd month post-transplant.  Urine for UA and protein/creatinine ratio monthly.  Serum BK - PCR at 1-, 3-, 6-, 9-, 12-, 18-, 24-, 36-, 48-, and 60 months post-transplant.  Hepatic panel at 1-, 2-, 3-, 6-, 9-, 12-, 18-, 24-, and 36- months post-transplant.    Kashif Reynaga MD       Education:   Material provided to the patient.  Patient reminded to call with any health changes since these can be early signs of significant complications.  Also, I advised the patient to be sure any new medications or changes of old medications are discussed with either a pharmacist or physician knowledgeable with transplant to avoid rejection/drug toxicity related to significant drug interactions.    Patient advised that it is recommended that all transplant candidates, and their close contacts and household members receive Covid vaccination.    UNOS Patient Status  Functional Status: 100% - Normal, no complaints, no evidence of disease  Physical Capacity: No Limitations

## 2022-11-04 NOTE — Clinical Note
November 4, 2022                     [address]  [address]  [facility] [address]  Phone: [phone]   Patient: Bacilio Becerra   MR Number: [id number]   YOB: 1960   Date of Visit: 11/4/2022       Dear      Thank you for referring Bacilio Becerra to me for evaluation. Attached you will find relevant portions of my assessment and plan of care.    If you have questions, please do not hesitate to call me. I look forward to following Bacilio Becerra along with you.    Sincerely,    Kashif Reynaga MD    Enclosure    If you would like to receive this communication electronically, please contact [email or web address] or [phone] to request [employer] access.    [employer] is a tool which provides read-only access to select patient information with whom you have a relationship. Its easy to use and provides real time access to review your patients record including encounter summaries, notes, results, and demographic information.    If you feel you have received this communication in error or would no longer like to receive these types of communications, please e-mail [email or web address]

## 2022-11-07 ENCOUNTER — TELEPHONE (OUTPATIENT)
Dept: TRANSPLANT | Facility: CLINIC | Age: 62
End: 2022-11-07

## 2022-11-07 NOTE — TELEPHONE ENCOUNTER
Patient has return to [place].  Communicated with Norma Gaspar in International department. States she will contact patient and keep coordinator posted.Per Norma patient to return to the [place] once she starts Prograf XL.       ----- Message from Claudia Jones RN sent at 11/4/2022  2:11 PM CDT -----  I made a lab appointment for 3wks from now to repeat FK  ----- Message -----  From: Kashif Reynaga MD  Sent: 11/4/2022  11:02 AM CDT  To: [facility] Post-Kidney Transplant Clinical    Prograf dose increased a few days ago  Will repeat a tacro level in 3 weeks   Please see my note.  She will continue with standard release prograf for now but her insurance changed to XL prograf , so in 4 month she will start around 3 mg of prograf XL and will need a repeat level 4 days and 1 week after the switch

## 2022-11-30 ENCOUNTER — PATIENT MESSAGE (OUTPATIENT)
Dept: TRANSPLANT | Facility: CLINIC | Age: 62
End: 2022-11-30

## 2022-12-06 ENCOUNTER — LAB VISIT (OUTPATIENT)
Dept: LAB | Facility: HOSPITAL | Age: 62
End: 2022-12-06
Attending: INTERNAL MEDICINE

## 2022-12-06 DIAGNOSIS — Z94.0 LIVING-DONOR KIDNEY TRANSPLANT RECIPIENT: ICD-10-CM

## 2022-12-06 LAB — TACROLIMUS BLD-MCNC: 5.2 NG/ML (ref 5–15)

## 2022-12-06 PROCEDURE — 80197 ASSAY OF TACROLIMUS: CPT | Performed by: INTERNAL MEDICINE

## 2022-12-06 PROCEDURE — 36415 COLL VENOUS BLD VENIPUNCTURE: CPT | Performed by: INTERNAL MEDICINE

## 2022-12-07 ENCOUNTER — PATIENT MESSAGE (OUTPATIENT)
Dept: TRANSPLANT | Facility: CLINIC | Age: 62
End: 2022-12-07

## 2022-12-07 ENCOUNTER — TELEPHONE (OUTPATIENT)
Dept: TRANSPLANT | Facility: CLINIC | Age: 62
End: 2022-12-07

## 2022-12-07 NOTE — TELEPHONE ENCOUNTER
Norma Gaspar in International notified.  ----- Message from Kashif Reynaga MD sent at 12/7/2022 10:28 AM CST -----  3 months  ----- Message -----  From: Giovanny Eugene RN  Sent: 12/6/2022   2:49 PM CST  To: Kashif Reynaga MD    Do you want a repeat in the near future ?   ----- Message -----  From: Kashif Reynaga MD  Sent: 12/6/2022   1:55 PM CST  To: [facility] Post-Kidney Transplant Clinical    Labs and diagnostic tests were reviewed. No action/changes indicated.

## 2023-03-08 ENCOUNTER — LAB VISIT (OUTPATIENT)
Dept: LAB | Facility: HOSPITAL | Age: 63
End: 2023-03-08
Attending: INTERNAL MEDICINE

## 2023-03-08 DIAGNOSIS — Z94.0 LIVING-DONOR KIDNEY TRANSPLANT RECIPIENT: ICD-10-CM

## 2023-03-08 PROCEDURE — 36415 COLL VENOUS BLD VENIPUNCTURE: CPT | Performed by: INTERNAL MEDICINE

## 2023-03-08 PROCEDURE — 80197 ASSAY OF TACROLIMUS: CPT | Performed by: INTERNAL MEDICINE

## 2023-03-09 DIAGNOSIS — Z29.89 PROPHYLACTIC IMMUNOTHERAPY: ICD-10-CM

## 2023-03-09 DIAGNOSIS — Z94.0 LIVING-DONOR KIDNEY TRANSPLANT RECIPIENT: Chronic | ICD-10-CM

## 2023-03-09 DIAGNOSIS — D84.9 IMMUNOSUPPRESSION: Chronic | ICD-10-CM

## 2023-03-09 LAB — TACROLIMUS BLD-MCNC: 6.8 NG/ML (ref 5–15)

## 2023-03-09 RX ORDER — TACROLIMUS 0.5 MG/1
0.5 CAPSULE ORAL NIGHTLY
Qty: 30 CAPSULE | Refills: 11 | Status: SHIPPED | OUTPATIENT
Start: 2023-03-09 | End: 2023-03-09 | Stop reason: DRUGHIGH

## 2023-03-09 RX ORDER — TACROLIMUS 1 MG/1
CAPSULE ORAL
Qty: 120 CAPSULE | Refills: 11 | Status: SHIPPED | OUTPATIENT
Start: 2023-03-09 | End: 2023-03-10 | Stop reason: ALTCHOICE

## 2023-03-09 RX ORDER — TACROLIMUS 1 MG/1
CAPSULE ORAL
Qty: 90 CAPSULE | Refills: 11 | Status: SHIPPED | OUTPATIENT
Start: 2023-03-09 | End: 2023-03-09 | Stop reason: DRUGHIGH

## 2023-03-09 NOTE — PROGRESS NOTES
This is our international patient that I saw a few months ago and increased her tacrolimus due to low level was now is trending up.  Can she do 2 mg AM and 1.5 mg PM and repeat a level in 4 weeks

## 2023-03-09 NOTE — TELEPHONE ENCOUNTER
Nory Galo in International notified and will contact patient in [place] with changes.  ----- Message from Kashif Reynaga MD sent at 3/9/2023 10:04 AM CST -----  This is our international patient that I saw a few months ago and increased her tacrolimus due to low level was now is trending up.  Can she do 2 mg AM and 1.5 mg PM and repeat a level in 4 weeks

## 2023-03-09 NOTE — TELEPHONE ENCOUNTER
Message sent to Nory Galo in International and I am awaiting on response from patient.  ----- Message from Kashif Reynaga MD sent at 3/9/2023  1:52 PM CST -----  Ok keep same tacro dose and repeat tacro level and BMP in 2 months. She does not to travel, just sent the specimen  ----- Message -----  From: Giovanny Eugene RN  Sent: 3/9/2023  12:46 PM CST  To: Kashif Reynaga MD, #    Dr Juhi, Patient states she only has a 9 day supply regular Tacrolimus , but just received a supply of Tacrolimus XR. She also states it is difficult to get 0.5mg Tacrolimus in [place].  Please advise.   ----- Message -----  From: Kashif Reynaga MD  Sent: 3/9/2023  10:04 AM CST  To: [facility] Post-Kidney Transplant Clinical    This is our international patient that I saw a few months ago and increased her tacrolimus due to low level was now is trending up.  Can she do 2 mg AM and 1.5 mg PM and repeat a level in 4 weeks

## 2023-03-10 ENCOUNTER — TELEPHONE (OUTPATIENT)
Dept: TRANSPLANT | Facility: CLINIC | Age: 63
End: 2023-03-10

## 2023-03-10 DIAGNOSIS — Z94.0 LIVING-DONOR KIDNEY TRANSPLANT RECIPIENT: Primary | ICD-10-CM

## 2023-03-10 NOTE — TELEPHONE ENCOUNTER
Nory Galo in international department notified and will communicate with patient. Patient to repeat labs on 3/27 and 4/10/2023.  ----- Message from Luz Viramontes PharmD sent at 3/10/2023 10:23 AM CST -----  It seems that due to product availability in [place], the patient now has tacrolimus XL capsules (1mg) so will need to be converted from BID to daily tacrolimus.   Please note that based on the , this product will be most similar to Astagraf, not Envarsus.     My recommendations are as follows:  1.  Ok to switch to once daily formulation as long as product availability is consistent (do not want to be switching back and forth each month)    2.  Recommended conversion dose: Tacrolimus XL 4mg PO daily    3.  Follow up monitoring:  Recommend to recheck tacrolimus level in 1 - 2 weeks given new formulation.  Would recommend at least 2 levels at goal before going back to regular lab schedule.    I will update the med rec to reflect this change.  Dr Reynaag, please let us know if you want to do anything different.    Luz        ----- Message -----  From: Kashif Reynaga MD  Sent: 3/9/2023   1:53 PM CST  To: Giovanny Eugene RN, #    Ok keep same tacro dose and repeat tacro level and BMP in 2 months. She does not to travel, just sent the specimen  ----- Message -----  From: Giovanny Eugene RN  Sent: 3/9/2023  12:46 PM CST  To: Kashif Reynaga MD, #    Dr Reynaga, Patient states she only has a 9 day supply regular Tacrolimus , but just received a supply of Tacrolimus XR. She also states it is difficult to get 0.5mg Tacrolimus in [place].  Please advise.   ----- Message -----  From: Kashif Reynaga MD  Sent: 3/9/2023  10:04 AM CST  To: [facility] Post-Kidney Transplant Clinical    This is our international patient that I saw a few months ago and increased her tacrolimus due to low level was now is trending up.  Can she do 2 mg AM and 1.5 mg PM and repeat a level in 4 weeks

## 2023-03-28 ENCOUNTER — DOCUMENTATION ONLY (OUTPATIENT)
Dept: TRANSPLANT | Facility: CLINIC | Age: 63
End: 2023-03-28

## 2023-03-28 LAB
EXT ALBUMIN: 4.1
EXT ALKALINE PHOSPHATASE: ABNORMAL
EXT ALLOSURE: ABNORMAL
EXT ALT: ABNORMAL
EXT AMYLASE: ABNORMAL
EXT ANC: ABNORMAL
EXT AST: ABNORMAL
EXT BACTERIA UA: ABNORMAL
EXT BANDS%: ABNORMAL
EXT BILIRUBIN DIRECT: ABNORMAL
EXT BILIRUBIN TOTAL: ABNORMAL
EXT BK VIRUS DNA QN PCR: ABNORMAL
EXT BUN: 24
EXT C PEPTIDE: ABNORMAL
EXT CALCIUM: 9.7
EXT CHLORIDE: 106
EXT CHOLESTEROL: ABNORMAL
EXT CMV DNA QUANT. BY PCR: ABNORMAL
EXT CO2: 29
EXT CREATININE UA: ABNORMAL
EXT CREATININE: 1.2 MG/DL
EXT CYCLOSPORINE LVL: ABNORMAL
EXT EBV DNA BY PCR: ABNORMAL
EXT EBV IGG: ABNORMAL
EXT EGFR NO RACE VARIABLE: ABNORMAL
EXT EOSINOPHIL%: 1.8
EXT FERRITIN: ABNORMAL
EXT GFR MDRD AF AMER: ABNORMAL
EXT GFR MDRD NON AF AMER: ABNORMAL
EXT GLUCOSE UA: ABNORMAL
EXT GLUCOSE: 89
EXT HBV DNA QUANT PCR: ABNORMAL
EXT HCV QUANT: ABNORMAL
EXT HDL: ABNORMAL
EXT HEMATOCRIT: 39.6
EXT HEMOGLOBIN A1C: ABNORMAL
EXT HEMOGLOBIN: 12.9
EXT HIV RNA QUANT PCR: ABNORMAL
EXT IMMUNKNOW (STIMULATED): ABNORMAL
EXT IRON SATURATION: ABNORMAL
EXT LDH, TOTAL: ABNORMAL
EXT LDL CHOLESTEROL: ABNORMAL
EXT LEFLUNOMIDE METABOLITE: ABNORMAL
EXT LIPASE: ABNORMAL
EXT LYMPH%: 31.5
EXT MAGNESIUM: ABNORMAL
EXT MONOCYTES%: 7.9
EXT NITRITES UA: ABNORMAL
EXT PHOSPHORUS: 3.1
EXT PLATELETS: 252
EXT POTASSIUM: 4.7
EXT PROT/CREAT RATIO UR: ABNORMAL
EXT PROTEIN TOTAL: 6.9
EXT PROTEIN UA: ABNORMAL
EXT PTH, INTACT: ABNORMAL
EXT RBC UA: ABNORMAL
EXT SEGS%: 58.2
EXT SERUM IRON: ABNORMAL
EXT SIROLIMUS LVL: ABNORMAL
EXT SODIUM: 140 MMOL/L
EXT TACROLIMUS LVL: ABNORMAL
EXT TIBC: ABNORMAL
EXT TRIGLYCERIDES: ABNORMAL
EXT URIC ACID: ABNORMAL
EXT URINE CULTURE: ABNORMAL
EXT URINE PROTEIN: ABNORMAL
EXT VIT D 25 HYDROXY: ABNORMAL
EXT WBC UA: ABNORMAL
EXT WBC: 6.97
PARVOVIRUS B19 DNA BY PCR: ABNORMAL
QUANTIFERON GOLD TB: ABNORMAL

## 2023-03-29 ENCOUNTER — LAB VISIT (OUTPATIENT)
Dept: LAB | Facility: HOSPITAL | Age: 63
End: 2023-03-29
Attending: INTERNAL MEDICINE

## 2023-03-29 DIAGNOSIS — Z94.0 LIVING-DONOR KIDNEY TRANSPLANT RECIPIENT: ICD-10-CM

## 2023-03-29 PROCEDURE — 80197 ASSAY OF TACROLIMUS: CPT | Performed by: INTERNAL MEDICINE

## 2023-03-29 PROCEDURE — 36415 COLL VENOUS BLD VENIPUNCTURE: CPT | Performed by: INTERNAL MEDICINE

## 2023-03-30 ENCOUNTER — TELEPHONE (OUTPATIENT)
Dept: TRANSPLANT | Facility: CLINIC | Age: 63
End: 2023-03-30

## 2023-03-30 LAB — TACROLIMUS BLD-MCNC: 4.7 NG/ML (ref 5–15)

## 2023-03-30 NOTE — TELEPHONE ENCOUNTER
Nory Galo in International notified of results and will communicate results to patient in [place].  ----- Message from Kashif Reynaga MD sent at 3/30/2023  9:46 AM CDT -----  Labs and diagnostic tests were reviewed. No action/changes indicated.

## 2023-04-20 ENCOUNTER — LAB VISIT (OUTPATIENT)
Dept: LAB | Facility: HOSPITAL | Age: 63
End: 2023-04-20
Attending: INTERNAL MEDICINE

## 2023-04-20 DIAGNOSIS — Z94.0 KIDNEY REPLACED BY TRANSPLANT: Primary | ICD-10-CM

## 2023-04-20 PROCEDURE — 80197 ASSAY OF TACROLIMUS: CPT | Performed by: INTERNAL MEDICINE

## 2023-04-21 LAB — TACROLIMUS BLD-MCNC: 4.8 NG/ML (ref 5–15)

## 2023-06-27 DIAGNOSIS — Z94.0 KIDNEY REPLACED BY TRANSPLANT: Primary | ICD-10-CM

## 2023-08-23 ENCOUNTER — OFFICE VISIT (OUTPATIENT)
Dept: TRANSPLANT | Facility: CLINIC | Age: 63
End: 2023-08-23

## 2023-08-23 ENCOUNTER — LAB VISIT (OUTPATIENT)
Dept: LAB | Facility: HOSPITAL | Age: 63
End: 2023-08-23
Attending: INTERNAL MEDICINE

## 2023-08-23 VITALS
WEIGHT: 123 LBS | RESPIRATION RATE: 16 BRPM | HEART RATE: 67 BPM | HEIGHT: 63 IN | OXYGEN SATURATION: 97 % | BODY MASS INDEX: 21.79 KG/M2 | DIASTOLIC BLOOD PRESSURE: 72 MMHG | SYSTOLIC BLOOD PRESSURE: 153 MMHG | TEMPERATURE: 97 F

## 2023-08-23 DIAGNOSIS — Z94.0 KIDNEY REPLACED BY TRANSPLANT: ICD-10-CM

## 2023-08-23 DIAGNOSIS — Z94.0 LIVING-DONOR KIDNEY TRANSPLANT RECIPIENT: Chronic | ICD-10-CM

## 2023-08-23 DIAGNOSIS — T86.12 FAILED KIDNEY TRANSPLANT: ICD-10-CM

## 2023-08-23 DIAGNOSIS — N18.31 STAGE 3A CHRONIC KIDNEY DISEASE: Primary | Chronic | ICD-10-CM

## 2023-08-23 DIAGNOSIS — E78.49 OTHER HYPERLIPIDEMIA: ICD-10-CM

## 2023-08-23 DIAGNOSIS — Z94.0 LIVING-DONOR KIDNEY TRANSPLANT RECIPIENT: ICD-10-CM

## 2023-08-23 DIAGNOSIS — Z79.899 IMMUNOSUPPRESSIVE MANAGEMENT ENCOUNTER FOLLOWING KIDNEY TRANSPLANT: ICD-10-CM

## 2023-08-23 DIAGNOSIS — Z94.0 IMMUNOSUPPRESSIVE MANAGEMENT ENCOUNTER FOLLOWING KIDNEY TRANSPLANT: ICD-10-CM

## 2023-08-23 DIAGNOSIS — I10 ESSENTIAL HYPERTENSION: Chronic | ICD-10-CM

## 2023-08-23 LAB
ALBUMIN SERPL BCP-MCNC: 3.9 G/DL (ref 3.5–5.2)
ALP SERPL-CCNC: 74 U/L (ref 55–135)
ALT SERPL W/O P-5'-P-CCNC: 17 U/L (ref 10–44)
ANION GAP SERPL CALC-SCNC: 7 MMOL/L (ref 8–16)
AST SERPL-CCNC: 14 U/L (ref 10–40)
BASOPHILS # BLD AUTO: 0.05 K/UL (ref 0–0.2)
BASOPHILS NFR BLD: 0.7 % (ref 0–1.9)
BILIRUB SERPL-MCNC: 0.8 MG/DL (ref 0.1–1)
BUN SERPL-MCNC: 23 MG/DL (ref 8–23)
CALCIUM SERPL-MCNC: 9 MG/DL (ref 8.7–10.5)
CHLORIDE SERPL-SCNC: 109 MMOL/L (ref 95–110)
CO2 SERPL-SCNC: 24 MMOL/L (ref 23–29)
CREAT SERPL-MCNC: 1.4 MG/DL (ref 0.5–1.4)
DIFFERENTIAL METHOD: ABNORMAL
EOSINOPHIL # BLD AUTO: 0.1 K/UL (ref 0–0.5)
EOSINOPHIL NFR BLD: 1.6 % (ref 0–8)
ERYTHROCYTE [DISTWIDTH] IN BLOOD BY AUTOMATED COUNT: 13.6 % (ref 11.5–14.5)
EST. GFR  (NO RACE VARIABLE): 42.8 ML/MIN/1.73 M^2
GLUCOSE SERPL-MCNC: 80 MG/DL (ref 70–110)
HCT VFR BLD AUTO: 40.9 % (ref 37–48.5)
HGB BLD-MCNC: 12.9 G/DL (ref 12–16)
IMM GRANULOCYTES # BLD AUTO: 0.03 K/UL (ref 0–0.04)
IMM GRANULOCYTES NFR BLD AUTO: 0.4 % (ref 0–0.5)
LYMPHOCYTES # BLD AUTO: 2.7 K/UL (ref 1–4.8)
LYMPHOCYTES NFR BLD: 38.7 % (ref 18–48)
MCH RBC QN AUTO: 30.2 PG (ref 27–31)
MCHC RBC AUTO-ENTMCNC: 31.5 G/DL (ref 32–36)
MCV RBC AUTO: 96 FL (ref 82–98)
MONOCYTES # BLD AUTO: 0.6 K/UL (ref 0.3–1)
MONOCYTES NFR BLD: 7.9 % (ref 4–15)
NEUTROPHILS # BLD AUTO: 3.6 K/UL (ref 1.8–7.7)
NEUTROPHILS NFR BLD: 50.7 % (ref 38–73)
NRBC BLD-RTO: 0 /100 WBC
PLATELET # BLD AUTO: 268 K/UL (ref 150–450)
PMV BLD AUTO: 9.6 FL (ref 9.2–12.9)
POTASSIUM SERPL-SCNC: 3.8 MMOL/L (ref 3.5–5.1)
PROT SERPL-MCNC: 7.2 G/DL (ref 6–8.4)
RBC # BLD AUTO: 4.27 M/UL (ref 4–5.4)
SODIUM SERPL-SCNC: 140 MMOL/L (ref 136–145)
TACROLIMUS BLD-MCNC: 5 NG/ML (ref 5–15)
WBC # BLD AUTO: 7.06 K/UL (ref 3.9–12.7)

## 2023-08-23 PROCEDURE — 80197 ASSAY OF TACROLIMUS: CPT | Performed by: INTERNAL MEDICINE

## 2023-08-23 PROCEDURE — 80053 COMPREHEN METABOLIC PANEL: CPT | Performed by: INTERNAL MEDICINE

## 2023-08-23 PROCEDURE — 99999 PR PBB SHADOW E&M-EST. PATIENT-LVL IV: ICD-10-PCS | Mod: PBBFAC,,, | Performed by: INTERNAL MEDICINE

## 2023-08-23 PROCEDURE — 36415 COLL VENOUS BLD VENIPUNCTURE: CPT | Performed by: INTERNAL MEDICINE

## 2023-08-23 PROCEDURE — 99215 OFFICE O/P EST HI 40 MIN: CPT | Mod: S$PBB,,, | Performed by: INTERNAL MEDICINE

## 2023-08-23 PROCEDURE — 99214 OFFICE O/P EST MOD 30 MIN: CPT | Mod: PBBFAC | Performed by: INTERNAL MEDICINE

## 2023-08-23 PROCEDURE — 99999 PR PBB SHADOW E&M-EST. PATIENT-LVL IV: CPT | Mod: PBBFAC,,, | Performed by: INTERNAL MEDICINE

## 2023-08-23 PROCEDURE — 99215 PR OFFICE/OUTPT VISIT, EST, LEVL V, 40-54 MIN: ICD-10-PCS | Mod: S$PBB,,, | Performed by: INTERNAL MEDICINE

## 2023-08-23 PROCEDURE — 85025 COMPLETE CBC W/AUTO DIFF WBC: CPT | Performed by: INTERNAL MEDICINE

## 2023-08-23 RX ORDER — VALSARTAN 80 MG/1
80 TABLET ORAL DAILY
COMMUNITY
End: 2023-08-23 | Stop reason: SINTOL

## 2023-08-23 RX ORDER — AMLODIPINE BESYLATE 5 MG/1
5 TABLET ORAL DAILY
Qty: 30 TABLET | Refills: 11 | Status: SHIPPED | OUTPATIENT
Start: 2023-08-23 | End: 2024-08-22

## 2023-08-23 NOTE — PROGRESS NOTES
Kidney Post-Transplant Assessment    Referring Physician:   Current Nephrologist:     ORGAN: RIGHT KIDNEY  Donor Type: living  PHS Increased Risk: no  Cold Ischemia: 240 mins  Induction Medications: steroids (prednisone,methylprednisolone,solumedrol,medrol,decadron)    Subjective:     CC:  Reassessment of renal allograft function and management of chronic immunosuppression.    HPI:  Ms. Becerra is a 63 y.o. year old White female who received a living kidney transplant on 8/7/97.  She has CKD stage 3 - GFR 30-59 and her baseline creatinine is between 1.2 to 1.4. She takes prednisone for maintenance immunosuppression. She denies any recent hospitalizations or ER visits since her previous clinic visit.      Feels ok. No nausea vomit or diarrhea.     No chest pain or SOB.    She was getting treatment for osteoporosis and took alendronate but it was discontinued.     She has some questions about GFR and creatinine.    Refers some eye dry    Refers some episodes of elevated  DBP    Current Outpatient Medications on File Prior to Visit   Medication Sig Dispense Refill    MVI, ADULT NO.1 WITH VIT K IV 1 tablet Daily.      predniSONE (DELTASONE) 5 MG tablet Take 1 tablet (5 mg total) by mouth once daily. 30 tablet 11    tacrolimus, ASTAGRAF XL, (ASTAGRAF XL) 1 mg Cp24 Take 4 capsules (4 mg total) by mouth once daily.      valsartan (DIOVAN) 80 MG tablet Take 80 mg by mouth once daily.      fish oil-omega-3 fatty acids 300-1,000 mg capsule Take 2 g by mouth once daily.      sodium bicarbonate 650 MG tablet Take 2 tablets (1,300 mg total) by mouth 2 (two) times daily. (Patient not taking: Reported on 8/23/2023) 120 tablet 11     No current facility-administered medications on file prior to visit.        Review of Systems    Skin: no skin rash  CNS; no headaches, blurred vision, seizure, or syncope  ENT: No JVD,  Adenopathies,  nasal congestion. No oral lesions  Cardiac: No chest pain, dyspnea, claudication, edema or  "palpitations  Respiratory: No SOB, cough, hemoptysis   Gastro-intestinal: No diarrhea, constipation, abdominal pain, nausea, vomit. No ascitis  Genitourinary: no hematuria, dysuria, frequency, frequency  Musculoskeletal: joint pain, arthritis or vasculitic changes  Psych: alert awake, oriented, No cranial nerves deficit.      Objective:   Blood pressure (!) 153/72, pulse 67, temperature 97.3 °F (36.3 °C), temperature source Temporal, resp. rate 16, height 5' 3.03" (1.601 m), weight 55.8 kg (123 lb 0.3 oz), SpO2 97 %.body mass index is 21.77 kg/m².    Physical Exam    Head: normocephalic  Neck: No JVD, cervical axillary, or femoral adenopathies  Heart: no murmurs, Normal s1 and s2, No gallops, no rubs, No murmurs  Lungs; CTA, good respiratory effort, no crackles  Abdomen: soft, non tender, no splenomegaly or hepatomegaly, no massess, no bruits  Extremities: No edema, skin rash, joint pain  SNC: awake, alert oriented. Cranial nerves are intact, no focalized, sensitivity and strength preserved      Labs:  Lab Results   Component Value Date    WBC 7.06 08/23/2023    HGB 12.9 08/23/2023    HCT 40.9 08/23/2023     08/23/2023    K 3.8 08/23/2023     08/23/2023    CO2 24 08/23/2023    BUN 23 08/23/2023    CREATININE 1.4 08/23/2023    EGFRNORACEVR 42.8 (A) 08/23/2023    CALCIUM 9.0 08/23/2023    PHOS 3.1 11/01/2022    MG 1.9 11/01/2022    ALBUMIN 3.9 08/23/2023    AST 14 08/23/2023    ALT 17 08/23/2023       Lab Results   Component Value Date    EXTWBC 6.97 03/27/2023    EXTSEGS 58.2 03/27/2023    EXTPLATELETS 252 03/27/2023    EXTHEMOGLOBI 12.9 03/27/2023    EXTHEMATOCRI 39.6 03/27/2023    EXTCREATININ 1.2 03/27/2023    EXTSODIUM 140 03/27/2023    EXTPOTASSIUM 4.7 03/27/2023    EXTBUN 24 (H) 03/27/2023    EXTCO2 29 03/27/2023    EXTCALCIUM 9.7 03/27/2023    EXTPHOSPHORU 3.1 03/27/2023    EXTGLUCOSE 89 03/27/2023    EXTALBUMIN 4.1 03/27/2023       No results found for: "EXTCYCLOSLVL", "EXTSIROLIMUS", " ""EXTTACROLVL", "EXTPROTCRE", "EXTPTHINTACT", "EXTPROTEINUA", "EXTWBCUA", "EXTRBCUA"    Labs were reviewed with the patient.    Assessment:     1. Stage 3a chronic kidney disease    2. Essential hypertension    3. 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 Immunosuppressive management encounter following kidney transplant    5. Living-donor kidney transplant #2 8/7/1997    6. Other hyperlipidemia        Plan:     Will d/c valsartan  Will start amlodipine 5 mg daily     1. CKD stage 32 with stable creatinine, she is on valsartan in the last 6 months : will continue follow up as per our center guidelines. patient to continue close follow up with the local General nephrologist. Education provided in appropriate fluid intake, potassium intake. Continue with oral hydration.    1A. Pancreatic Function: N/A for non-pancreas allograft recipients:     2. High risk immunosuppression medication for organ transplantation, requiring regular intensive follow up and monitoring :   Lab Results   Component Value Date    TACROLIMUS 5.0 08/23/2023    TACROLIMUS 4.8 (L) 04/20/2023    TACROLIMUS 4.7 (L) 03/27/2023     No results found for: "CYCLOSPORINE"  @   Will closely monitor for toxicities, education provided about adherence to medicines and need to communicate any side effects to the transplant nurse or physician.    3. Allograft Function:stable at baseline for the patient. Continue follow up as per our guidelines and with the local General nephrologist. Communication will be sent today.  Lab Results   Component Value Date    CREATININE 1.4 08/23/2023    CREATININE 1.3 11/01/2022    CREATININE 1.2 10/05/2021     No results found for: "AMYLASE", "LIPASE"    4. Hypertension management:  Continue with home blood pressure monitoring, low salt and healthy life discussed with the patient..    5. Metabolic Bone Disease/Secondary Hyperparathyroidism:calcium and phosphorus level discussed with the patient, patient will " continue follow up with the general nephrologist for management of metabolic bone disease. Will monitor PTH and Vit D per our transplant center guidelines.      Lab Results   Component Value Date    .6 (H) 10/05/2021    CALCIUM 9.0 08/23/2023    PHOS 3.1 11/01/2022    PHOS 3.7 10/05/2021    PHOS 3.3 07/24/2018       6. Electrolytes and acid base balance: reviewed with the patient, essentially within the normal range no need for acute changes today, will monitor as per our center guidelines.     Lab Results   Component Value Date     08/23/2023    K 3.8 08/23/2023     08/23/2023    CO2 24 08/23/2023    CO2 20 (L) 11/01/2022    CO2 18 (L) 10/05/2021       7. Anemia: will continue monitoring as per our center guidelines. No indication for acute intervention today.     Lab Results   Component Value Date    WBC 7.06 08/23/2023    HGB 12.9 08/23/2023    HCT 40.9 08/23/2023    MCV 96 08/23/2023     08/23/2023       8.Proteinuria: no proteinuria will continue with pr/cr ratio as per our center guidelines.  Lab Results   Component Value Date    PROTEINURINE <7 08/23/2023    CREATRANDUR 42.0 08/23/2023    UTPCR Unable to calculate 08/23/2023    UTPCR Unable to calculate 11/01/2022    UTPCR 0.10 10/05/2021        9. BK virus infection screening: will continue with urine or blood PCR as per our guidelines to prevent BK virus viremia and allograft dysfunction  Lab Results   Component Value Date    BKVIRUSDNAUR POSITIVE (A) 07/22/2014    BKQUANTURINE 5639 (H) 07/22/2014    BKVIRUSLOG 3.75 (H) 07/22/2014    BKVIRUSURINE Urine 07/22/2014         10. Weight and metabolic management: education provided provided during the clinic visit.   Body mass index is 21.77 kg/m².       11.Patient safety education regarding immunosuppression including prophylaxis posttransplant for CMV, PCP : Education provided about vaccination and prevention of infections.    12.  Cytopenias: no significant cytopenias will monitor as  per our guidelines. Medicine list reviewed including potential causes of drug-induced cytopenias     Lab Results   Component Value Date    WBC 7.06 08/23/2023    HGB 12.9 08/23/2023    HCT 40.9 08/23/2023    MCV 96 08/23/2023     08/23/2023       13. Post-transplant Prophylaxis; CMV Infection, PJP and Candida mucosistis and other indicated for this particular patient. OFF prophylaxis     I spoke with the patient for 30 minutes. More than half dedicated to counseling and education. All questions answered    Kashif Reynaga MD  Transplant Nephrology            Follow-up:   Annual follow-up with kidney transplant clinic per written guidelines.  Patient also reminded to follow-up with general nephrologist.    Labs: since patient remains at high risk for rejection and drug-related complications that warrant close monitoring, labs will be ordered as follows: continue twice weekly CBC, renal panel, and drug level for first month; then same labs once weekly through 3rd month post-transplant.  Urine for UA and protein/creatinine ratio monthly.  Serum BK - PCR at 1-, 3-, 6-, 9-, 12-, 18-, 24-, 36-, 48-, and 60 months post-transplant.  Hepatic panel at 1-, 2-, 3-, 6-, 9-, 12-, 18-, 24-, and 36- months post-transplant.    Kashif Reynaga MD       Education:   Material provided to the patient.  Patient reminded to call with any health changes since these can be early signs of significant complications.  Also, I advised the patient to be sure any new medications or changes of old medications are discussed with either a pharmacist or physician knowledgeable with transplant to avoid rejection/drug toxicity related to significant drug interactions.    Patient advised that it is recommended that all transplant candidates, and their close contacts and household members receive Covid vaccination.    UNOS Patient Status  Functional Status: 90% - Able to carry on normal activity: minor symptoms of disease  Physical Capacity: No  Limitations

## 2023-08-23 NOTE — LETTER
August 23, 2023                     [address]  [address]  [facility] [address]  Phone: [phone]   Patient: Bacilio Becerra   MR Number: [id number]   YOB: 1960   Date of Visit: 8/23/2023       Dear      Thank you for referring Bacilio Becerra to me for evaluation. Attached you will find relevant portions of my assessment and plan of care.    If you have questions, please do not hesitate to call me. I look forward to following Bacilio Becerra along with you.    Sincerely,    Kashif Reynaga MD    Enclosure    If you would like to receive this communication electronically, please contact [email or web address] or [phone] to request [employer] access.    [employer] is a tool which provides read-only access to select patient information with whom you have a relationship. Its easy to use and provides real time access to review your patients record including encounter summaries, notes, results, and demographic information.    If you feel you have received this communication in error or would no longer like to receive these types of communications, please e-mail [email or web address]

## 2023-11-20 NOTE — PROGRESS NOTES
Labs and diagnostic tests were reviewed. No action/changes indicated.   Patient requests all Lab, Cardiology, and Radiology Results on their Discharge Instructions

## 2024-02-15 ENCOUNTER — LAB VISIT (OUTPATIENT)
Dept: LAB | Facility: HOSPITAL | Age: 64
End: 2024-02-15
Attending: INTERNAL MEDICINE

## 2024-02-15 DIAGNOSIS — Z94.0 KIDNEY REPLACED BY TRANSPLANT: ICD-10-CM

## 2024-02-15 LAB — TACROLIMUS BLD-MCNC: 4.6 NG/ML (ref 5–15)

## 2024-02-15 PROCEDURE — 36415 COLL VENOUS BLD VENIPUNCTURE: CPT | Performed by: INTERNAL MEDICINE

## 2024-02-15 PROCEDURE — 80197 ASSAY OF TACROLIMUS: CPT | Performed by: INTERNAL MEDICINE

## 2024-08-19 ENCOUNTER — TELEPHONE (OUTPATIENT)
Dept: TRANSPLANT | Facility: CLINIC | Age: 64
End: 2024-08-19

## 2024-08-19 LAB
EXT BACTERIA UA: ABNORMAL
EXT BUN: 28.78
EXT CHOLESTEROL: 235.34
EXT CREATININE UA: 48.75
EXT CREATININE: 1.65 MG/DL
EXT CREATININE: 1.79 MG/DL
EXT EOSINOPHIL%: 1.6
EXT GFR MDRD NON AF AMER: 30.35
EXT GLUCOSE UA: ABNORMAL
EXT GLUCOSE: 85.88
EXT HDL: 97
EXT HEMATOCRIT: 37.9
EXT HEMOGLOBIN: 13.1
EXT LDL CHOLESTEROL: 116
EXT LYMPH%: 32.9
EXT MONOCYTES%: 8
EXT NITRITES UA: ABNORMAL
EXT PHOSPHORUS: 2.8
EXT PLATELETS: 297
EXT POTASSIUM: 4.3
EXT PROT/CREAT RATIO UR: 0
EXT PROTEIN UA: ABNORMAL
EXT RBC UA: ABNORMAL
EXT SEGS%: 57.2
EXT SODIUM: 147 MMOL/L
EXT TACROLIMUS LVL: 7.03
EXT TRIGLYCERIDES: 108.36
EXT URIC ACID: 4.9
EXT URINE PROTEIN: 0
EXT WBC UA: 0=1
EXT WBC: 6.7

## 2024-08-19 NOTE — TELEPHONE ENCOUNTER
Message forwarded to Norma Gaspar in International, to contact patient, presently living in [place].   ----- Message from Kashif Reynaga MD sent at 8/19/2024  1:42 PM CDT -----  See if she can get some IV fluids / kidney US /pr/cr ratio there by her nephrologist

## 2024-08-26 ENCOUNTER — TELEPHONE (OUTPATIENT)
Dept: TRANSPLANT | Facility: CLINIC | Age: 64
End: 2024-08-26

## 2024-08-26 DIAGNOSIS — Z94.0 KIDNEY REPLACED BY TRANSPLANT: Primary | ICD-10-CM

## 2024-08-26 LAB
EXT BACTERIA UA: ABNORMAL
EXT BUN: 19.16
EXT CREATININE: 1.47 MG/DL
EXT CREATININE: 1.55 MG/DL
EXT EOSINOPHIL%: 0.2
EXT GLUCOSE UA: ABNORMAL
EXT HEMATOCRIT: 39.2
EXT HEMOGLOBIN: 13.3
EXT LYMPH%: 15.8
EXT MONOCYTES%: 6.9
EXT NITRITES UA: ABNORMAL
EXT PLATELETS: 260
EXT PROTEIN UA: ABNORMAL
EXT RBC UA: ABNORMAL
EXT SEGS%: 76.8
EXT WBC UA: ABNORMAL
EXT WBC: 6.89

## 2024-08-26 NOTE — PROGRESS NOTES
She is getting close to her baseline, see if we can repeat labs in 4 weeks including tacro level. Transplant 27 years ago.

## 2024-08-26 NOTE — PROGRESS NOTES
Oh this is the last creatinine!! GREAT,  which looks at baseline, follow up with her local nephrologist, hydration BP management

## 2024-08-26 NOTE — TELEPHONE ENCOUNTER
Norma Gaspar in international will communicate instructions to patient.  Patient presently living in [place].  ----- Message from Kashif Reynaga MD sent at 8/26/2024 11:36 AM CDT -----  Oh this is the last creatinine!! GREAT,  which looks at baseline, follow up with her local nephrologist, hydration BP management

## 2024-10-03 ENCOUNTER — LAB VISIT (OUTPATIENT)
Dept: LAB | Facility: HOSPITAL | Age: 64
End: 2024-10-03
Attending: INTERNAL MEDICINE

## 2024-10-03 DIAGNOSIS — Z94.0 KIDNEY REPLACED BY TRANSPLANT: ICD-10-CM

## 2024-10-03 LAB
BILIRUB UR QL STRIP: NEGATIVE
CLARITY UR REFRACT.AUTO: CLEAR
COLOR UR AUTO: COLORLESS
CREAT UR-MCNC: 31 MG/DL (ref 15–325)
GLUCOSE UR QL STRIP: NEGATIVE
HGB UR QL STRIP: NEGATIVE
KETONES UR QL STRIP: NEGATIVE
LEUKOCYTE ESTERASE UR QL STRIP: NEGATIVE
NITRITE UR QL STRIP: NEGATIVE
PH UR STRIP: 7 [PH] (ref 5–8)
PROT UR QL STRIP: NEGATIVE
PROT UR-MCNC: <7 MG/DL (ref 0–15)
PROT/CREAT UR: NORMAL MG/G{CREAT} (ref 0–0.2)
SP GR UR STRIP: 1.01 (ref 1–1.03)
URN SPEC COLLECT METH UR: ABNORMAL

## 2024-10-03 PROCEDURE — 84156 ASSAY OF PROTEIN URINE: CPT | Performed by: INTERNAL MEDICINE

## 2024-10-03 PROCEDURE — 81003 URINALYSIS AUTO W/O SCOPE: CPT | Performed by: INTERNAL MEDICINE

## 2024-10-04 ENCOUNTER — OFFICE VISIT (OUTPATIENT)
Dept: TRANSPLANT | Facility: CLINIC | Age: 64
End: 2024-10-04

## 2024-10-04 VITALS
DIASTOLIC BLOOD PRESSURE: 67 MMHG | SYSTOLIC BLOOD PRESSURE: 146 MMHG | RESPIRATION RATE: 18 BRPM | HEIGHT: 63 IN | BODY MASS INDEX: 21.95 KG/M2 | OXYGEN SATURATION: 100 % | WEIGHT: 123.88 LBS | HEART RATE: 75 BPM | TEMPERATURE: 97 F

## 2024-10-04 DIAGNOSIS — N18.32 STAGE 3B CHRONIC KIDNEY DISEASE: Chronic | ICD-10-CM

## 2024-10-04 DIAGNOSIS — Z79.899 IMMUNOSUPPRESSIVE MANAGEMENT ENCOUNTER FOLLOWING KIDNEY TRANSPLANT: ICD-10-CM

## 2024-10-04 DIAGNOSIS — Z94.0 IMMUNOSUPPRESSIVE MANAGEMENT ENCOUNTER FOLLOWING KIDNEY TRANSPLANT: ICD-10-CM

## 2024-10-04 DIAGNOSIS — D84.9 IMMUNOSUPPRESSION: Chronic | ICD-10-CM

## 2024-10-04 DIAGNOSIS — Z94.0 LIVING-DONOR KIDNEY TRANSPLANT RECIPIENT: Chronic | ICD-10-CM

## 2024-10-04 DIAGNOSIS — T86.12 FAILED KIDNEY TRANSPLANT: Primary | ICD-10-CM

## 2024-10-04 PROCEDURE — 99999 PR PBB SHADOW E&M-EST. PATIENT-LVL IV: CPT | Mod: PBBFAC,,, | Performed by: INTERNAL MEDICINE

## 2024-10-04 PROCEDURE — 99214 OFFICE O/P EST MOD 30 MIN: CPT | Mod: PBBFAC | Performed by: INTERNAL MEDICINE

## 2024-10-04 RX ORDER — FENOFIBRIC ACID 135 MG/1
135 CAPSULE, DELAYED RELEASE ORAL DAILY
COMMUNITY

## 2024-10-04 RX ORDER — SODIUM BICARBONATE 650 MG/1
650 TABLET ORAL 2 TIMES DAILY
Qty: 180 TABLET | Refills: 3 | Status: SHIPPED | OUTPATIENT
Start: 2024-10-04 | End: 2025-10-04

## 2024-10-04 RX ORDER — SODIUM BICARBONATE 650 MG/1
650 TABLET ORAL 2 TIMES DAILY
Qty: 60 TABLET | Refills: 11 | Status: SHIPPED | OUTPATIENT
Start: 2024-10-04 | End: 2024-10-04 | Stop reason: SDUPTHER

## 2024-10-04 RX ORDER — ATORVASTATIN CALCIUM 20 MG/1
20 TABLET, FILM COATED ORAL DAILY
COMMUNITY

## 2024-10-04 NOTE — LETTER
October 4, 2024                      [address]  [address]  [facility] [address]  Phone: [phone]   Patient: Bacilio Becerra   MR Number: [id number]   YOB: 1960   Date of Visit: 10/4/2024       Dear       Thank you for referring Bacilio Becerra to me for evaluation. Attached you will find relevant portions of my assessment and plan of care.    If you have questions, please do not hesitate to call me. I look forward to following Bacilio Becerra along with you.    Sincerely,    Kashif Reynaga MD    Enclosure    If you would like to receive this communication electronically, please contact [email or web address] or [phone] to request [employer] Link access.    [employer] Link is a tool which provides read-only access to select patient information with whom you have a relationship. Its easy to use and provides real time access to review your patients record including encounter summaries, notes, results, and demographic information.    If you feel you have received this communication in error or would no longer like to receive these types of communications, please e-mail [email or web address]

## 2024-10-04 NOTE — PROGRESS NOTES
Kidney Post-Transplant Assessment    Referring Physician:    Current Nephrologist:      ORGAN: RIGHT KIDNEY  Donor Type: living  PHS Increased Risk:   Cold Ischemia: 240 mins  Induction Medications: steroids (prednisone,methylprednisolone,solumedrol,medrol,decadron)    Subjective:     CC:  Reassessment of renal allograft function and management of chronic immunosuppression.    HPI:  Ms. Becerra is a 64 y.o. year old White female who received a living kidney transplant on 8/7/97.  She has CKD stage 2 - GFR 60-89 and her baseline creatinine is between 1.2 to 1.5. She takes  envarsus 4 mg daily an prednisone 5 mg daily   for maintenance immunosuppression. She denies any recent hospitalizations or ER visits since her previous clinic visit.    Had an admission in July 2024 due to appendicitis but feels fine.  No other issues to report to me today   She also has some questions about herpes zoster vaccine  Questions about the AG and the mild acidosis   Questions about tacro level, hydration and creatinine.      Current Outpatient Medications on File Prior to Visit   Medication Sig Dispense Refill    amLODIPine (NORVASC) 5 MG tablet Take 1 tablet (5 mg total) by mouth once daily. 30 tablet 11    atorvastatin (LIPITOR) 20 MG tablet Take 20 mg by mouth once daily.      fenofibric acid (TRILIPIX) 135 mg CpDR Take 135 mg by mouth Daily.      MVI, ADULT NO.1 WITH VIT K IV 1 tablet Daily.      predniSONE (DELTASONE) 5 MG tablet Take 1 tablet (5 mg total) by mouth once daily. 30 tablet 11    tacrolimus, ASTAGRAF XL, (ASTAGRAF XL) 1 mg Cp24 Take 4 capsules (4 mg total) by mouth once daily.      fish oil-omega-3 fatty acids 300-1,000 mg capsule Take 2 g by mouth once daily. (Patient not taking: Reported on 10/4/2024)      sodium bicarbonate 650 MG tablet Take 2 tablets (1,300 mg total) by mouth 2 (two) times daily. (Patient not taking: Reported on 8/23/2023) 120 tablet 11     No current facility-administered medications on file  "prior to visit.      Review of Systems    Skin: no skin rash  CNS; no headaches, blurred vision, seizure, or syncope  ENT: No JVD,  Adenopathies,  nasal congestion. No oral lesions  Cardiac: No chest pain, dyspnea, claudication, edema or palpitations  Respiratory: No SOB, cough, hemoptysis   Gastro-intestinal: No diarrhea, constipation, abdominal pain, nausea, vomit. No ascitis  Genitourinary: no hematuria, dysuria, frequency, frequency  Musculoskeletal: joint pain, arthritis or vasculitic changes  Psych: alert awake, oriented, No cranial nerves deficit.      Objective:   Blood pressure (!) 146/67, pulse 75, temperature 97.3 °F (36.3 °C), temperature source Temporal, resp. rate 18, height 5' 3.03" (1.601 m), weight 56.2 kg (123 lb 14.4 oz), SpO2 100%.body mass index is 21.93 kg/m².    Physical Exam    Head: normocephalic  Neck: No JVD, cervical axillary, or femoral adenopathies  Heart: no murmurs, Normal s1 and s2, No gallops, no rubs, No murmurs  Lungs; CTA, good respiratory effort, no crackles  Abdomen: soft, non tender, no splenomegaly or hepatomegaly, no massess, no bruits  Extremities: No edema, skin rash, joint pain  SNC: awake, alert oriented. Cranial nerves are intact, no focalized, sensitivity and strength preserved      Labs:  Lab Results   Component Value Date    WBC 6.28 10/03/2024    HGB 12.5 10/03/2024    HCT 39.0 10/03/2024     10/03/2024    K 3.6 10/03/2024     (H) 10/03/2024    CO2 22 (L) 10/03/2024    BUN 26 (H) 10/03/2024    CREATININE 1.5 (H) 10/03/2024    EGFRNORACEVR 39.2 (A) 10/03/2024    CALCIUM 9.0 10/03/2024    PHOS 3.1 11/01/2022    MG 1.9 11/01/2022    ALBUMIN 4.1 10/03/2024    AST 14 10/03/2024    ALT 16 10/03/2024       Lab Results   Component Value Date    EXTWBC 6.89 08/23/2024    EXTSEGS 76.80 (H) 08/23/2024    EXTPLATELETS 260 08/23/2024    EXTHEMOGLOBI 13.30 08/23/2024    EXTHEMATOCRI 39.20 08/23/2024    EXTCREATININ 1.47 (H) 08/23/2024    EXTSODIUM 147 08/15/2024    " "EXTPOTASSIUM 4.3 08/15/2024    EXTBUN 19.16 08/20/2024    EXTCO2 29 03/27/2023    EXTCALCIUM 9.7 03/27/2023    EXTPHOSPHORU 2.8 08/15/2024    EXTGLUCOSE 85.88 08/15/2024    EXTALBUMIN 4.1 03/27/2023       Lab Results   Component Value Date    EXTTACROLVL 7.03 08/15/2024    EXTPROTCRE 0 08/15/2024    EXTPROTEINUA Neg 08/23/2024    EXTWBCUA 0-2 08/23/2024    EXTRBCUA 0-1 08/23/2024       Labs were reviewed with the patient.    Assessment:     1. Failed kidney transplant #1, LRD from brother 1987    2. Stage 3b chronic kidney disease    3. Immunosuppressive management encounter following kidney transplant    4. Living-donor kidney transplant #2 8/7/1997    5. Immunosuppression        Plan:   1. CKD stage  3b stable. Continue with monitoring and follow up with her local nephrologist.  : will continue follow up as per our center guidelines. patient to continue close follow up with the local General nephrologist. Education provided in appropriate fluid intake, potassium intake. Continue with oral hydration.    1A. Pancreatic Function: N/A for non-pancreas allograft recipients:     2. High risk immunosuppression medication for organ transplantation, requiring regular intensive follow up and monitoring : envarsus and tacro level at target.   Lab Results   Component Value Date    TACROLIMUS 5.5 10/03/2024    TACROLIMUS 4.6 (L) 02/12/2024    TACROLIMUS 5.0 08/23/2023     No results found for: "CYCLOSPORINE"  @   Will closely monitor for toxicities, education provided about adherence to medicines and need to communicate any side effects to the transplant nurse or physician.    3. Allograft Function:stable at baseline for the patient. Continue follow up as per our guidelines and with the local General nephrologist. Communication will be sent today.  Lab Results   Component Value Date    CREATININE 1.5 (H) 10/03/2024    CREATININE 1.4 08/23/2023    CREATININE 1.3 11/01/2022     No results found for: "AMYLASE", "LIPASE"    4. " Hypertension management: controlled on Norvasc 2.5 mg daily . She said that with 5 mg daily her BP was too low  Continue with home blood pressure monitoring, low salt and healthy life discussed with the patient..    5. Metabolic Bone Disease/Secondary Hyperparathyroidism:calcium and phosphorus level discussed with the patient, patient will continue follow up with the general nephrologist for management of metabolic bone disease. Will monitor PTH and Vit D per our transplant center guidelines.      Lab Results   Component Value Date    .6 (H) 10/05/2021    CALCIUM 9.0 10/03/2024    PHOS 3.1 11/01/2022    PHOS 3.7 10/05/2021    PHOS 3.3 07/24/2018       6. Electrolytes and acid base balance: will start sodium bicarbonate 650 bid reviewed with the patient, essentially within the normal range no need for acute changes today, will monitor as per our center guidelines.     Lab Results   Component Value Date     10/03/2024    K 3.6 10/03/2024     (H) 10/03/2024    CO2 22 (L) 10/03/2024    CO2 24 08/23/2023    CO2 20 (L) 11/01/2022       7. Anemia: normal h/h will continue monitoring as per our center guidelines. No indication for acute intervention today.     Lab Results   Component Value Date    WBC 6.28 10/03/2024    HGB 12.5 10/03/2024    HCT 39.0 10/03/2024    MCV 94 10/03/2024     10/03/2024       8.Proteinuria: no proteinuria will continue with pr/cr ratio as per our center guidelines.  Lab Results   Component Value Date    PROTEINURINE <7 10/03/2024    CREATRANDUR 31.0 10/03/2024    UTPCR Unable to calculate 10/03/2024    UTPCR Unable to calculate 08/23/2023    UTPCR Unable to calculate 11/01/2022        9. BK virus infection screening: will continue with urine or blood PCR as per our guidelines to prevent BK virus viremia and allograft dysfunction  Lab Results   Component Value Date    BKVIRUSDNAUR POSITIVE (A) 07/22/2014    BKQUANTURINE 5639 (H) 07/22/2014    BKVIRUSLOG 3.75 (H)  07/22/2014    BKVIRUSURINE Urine 07/22/2014         10. Weight and metabolic management: education provided provided during the clinic visit.   Body mass index is 21.93 kg/m².       11.Patient safety education regarding immunosuppression including prophylaxis posttransplant for CMV, PCP : Education provided about vaccination and prevention of infections.    12.  Cytopenias: no significant cytopenias will monitor as per our guidelines. Medicine list reviewed including potential causes of drug-induced cytopenias     Lab Results   Component Value Date    WBC 6.28 10/03/2024    HGB 12.5 10/03/2024    HCT 39.0 10/03/2024    MCV 94 10/03/2024     10/03/2024       13. Post-transplant Prophylaxis; CMV Infection, PJP and Candida mucosistis and other indicated for this particular patient. 27 anniversary OFF prophylaxis.     I spoke with the patient for 30 minutes. More than half dedicated to counseling and education. All questions answered    Kashif Reynaga MD  Transplant Nephrology            Follow-up:   Annual follow-up with kidney transplant clinic per written guidelines.  Patient also reminded to follow-up with general nephrologist.    Labs: since patient remains at high risk for rejection and drug-related complications that warrant close monitoring, labs will be ordered as follows: continue twice weekly CBC, renal panel, and drug level for first month; then same labs once weekly through 3rd month post-transplant.  Urine for UA and protein/creatinine ratio monthly.  Serum BK - PCR at 1-, 3-, 6-, 9-, 12-, 18-, 24-, 36-, 48-, and 60 months post-transplant.  Hepatic panel at 1-, 2-, 3-, 6-, 9-, 12-, 18-, 24-, and 36- months post-transplant.    Kashif Reynaga MD       Education:   Material provided to the patient.  Patient reminded to call with any health changes since these can be early signs of significant complications.  Also, I advised the patient to be sure any new medications or changes of old medications are  discussed with either a pharmacist or physician knowledgeable with transplant to avoid rejection/drug toxicity related to significant drug interactions.    Patient advised that it is recommended that all transplant candidates, and their close contacts and household members receive Covid vaccination.    UNOS Patient Status  Functional Status: 100% - Normal, no complaints, no evidence of disease  Physical Capacity: No Limitations

## 2025-04-24 ENCOUNTER — TELEPHONE (OUTPATIENT)
Dept: TRANSPLANT | Facility: CLINIC | Age: 65
End: 2025-04-24

## 2025-04-24 DIAGNOSIS — Z94.0 LIVING-DONOR KIDNEY TRANSPLANT RECIPIENT: ICD-10-CM

## 2025-04-24 RX ORDER — TACROLIMUS 1 MG/1
2 CAPSULE ORAL EVERY 12 HOURS
Start: 2025-04-24 | End: 2026-04-24

## 2025-04-24 NOTE — TELEPHONE ENCOUNTER
Phone call to patient via [employer].  Patient agreed to have labs drawn 7 days after starting Tacrolimus 2mg BID.  12 hour trough reviewed with patient.     ----- Message from Pharmacist Jasson sent at 4/24/2025  2:48 PM CDT -----  Regarding: RE: Nurse Baltazar  Contact: Qing Ext [phone]  Hello All,Many times patient's have to take more Astagraf XL in a day when compared to tacro IR (opposite of Envarsus). It looks like her last few levels are between 4-6, so I imagine 2mg BID will suffice, but it may cause her level to increase slightly. Can we get a level 5-7 days after changing?Thank you,Jasson Paiz, PharmD. BCTXP, [facility]  ----- Message -----  From: Giovanny Eugene RN  Sent: 4/24/2025   2:38 PM CDT  To: Kashif Reynaga MD; Abdominal Transplant Ph#  Subject: FW: Nurse Baltazar                                 Patient presently taking Tacrolimus, ASTAGRAF XL  4mg QD and has to take Regular Tacrolimus due to availability in [place].\She is asking on the dose of Regular Tacro she should start taking ?     2mg BID ?  ----- Message -----  From: Giovanny Eugene RN  Sent: 4/24/2025   1:28 PM CDT  To: Giovanny Eugene RN  Subject: FW: Nurse Baltazar                                   ----- Message -----  From: Macy Hoffmann  Sent: 4/24/2025  12:30 PM CDT  To: [facility] Post-Kidney Transplant Clinical  Subject: Nurse Giovanny Longo called from [employer] department - request return call at your convenienceMonika Ext [phone]

## 2025-05-14 ENCOUNTER — RESULTS FOLLOW-UP (OUTPATIENT)
Dept: TRANSPLANT | Facility: CLINIC | Age: 65
End: 2025-05-14

## 2025-05-14 ENCOUNTER — DOCUMENTATION ONLY (OUTPATIENT)
Dept: TRANSPLANT | Facility: CLINIC | Age: 65
End: 2025-05-14

## 2025-05-14 LAB — EXT TACROLIMUS LVL: 6.25

## 2025-06-02 ENCOUNTER — RESULTS FOLLOW-UP (OUTPATIENT)
Dept: TRANSPLANT | Facility: CLINIC | Age: 65
End: 2025-06-02

## 2025-06-02 ENCOUNTER — DOCUMENTATION ONLY (OUTPATIENT)
Dept: TRANSPLANT | Facility: CLINIC | Age: 65
End: 2025-06-02

## 2025-06-02 LAB — EXT TACROLIMUS LVL: 4.89

## 2025-07-22 ENCOUNTER — DOCUMENTATION ONLY (OUTPATIENT)
Dept: TRANSPLANT | Facility: CLINIC | Age: 65
End: 2025-07-22

## 2025-07-22 LAB — EXT TACROLIMUS LVL: 6.66
